# Patient Record
Sex: FEMALE | Race: WHITE | HISPANIC OR LATINO | Employment: FULL TIME | URBAN - METROPOLITAN AREA
[De-identification: names, ages, dates, MRNs, and addresses within clinical notes are randomized per-mention and may not be internally consistent; named-entity substitution may affect disease eponyms.]

---

## 2017-10-10 ENCOUNTER — HOSPITAL ENCOUNTER (OUTPATIENT)
Facility: MEDICAL CENTER | Age: 22
End: 2017-10-10
Payer: COMMERCIAL

## 2017-10-10 LAB
ALBUMIN SERPL BCP-MCNC: 4.2 G/DL (ref 3.2–4.9)
ALBUMIN/GLOB SERPL: 1.1 G/DL
ALP SERPL-CCNC: 96 U/L (ref 30–99)
ALT SERPL-CCNC: 219 U/L (ref 2–50)
ANION GAP SERPL CALC-SCNC: 9 MMOL/L (ref 0–11.9)
AST SERPL-CCNC: 89 U/L (ref 12–45)
BDY FAT % MEASURED: 37.1 %
BILIRUB SERPL-MCNC: 0.5 MG/DL (ref 0.1–1.5)
BP DIAS: 78 MMHG
BP SYS: 124 MMHG
BUN SERPL-MCNC: 11 MG/DL (ref 8–22)
CALCIUM SERPL-MCNC: 9.2 MG/DL (ref 8.5–10.5)
CHLORIDE SERPL-SCNC: 102 MMOL/L (ref 96–112)
CHOLEST SERPL-MCNC: 202 MG/DL (ref 100–199)
CO2 SERPL-SCNC: 24 MMOL/L (ref 20–33)
CREAT SERPL-MCNC: 0.58 MG/DL (ref 0.5–1.4)
DIABETES HTDIA: NO
EVENT NAME HTEVT: NORMAL
GFR SERPL CREATININE-BSD FRML MDRD: >60 ML/MIN/1.73 M 2
GLOBULIN SER CALC-MCNC: 3.8 G/DL (ref 1.9–3.5)
GLUCOSE SERPL-MCNC: 145 MG/DL (ref 65–99)
HDLC SERPL-MCNC: 42 MG/DL
HYPERTENSION HTHYP: NO
LDLC SERPL CALC-MCNC: 133 MG/DL
POTASSIUM SERPL-SCNC: 3.7 MMOL/L (ref 3.6–5.5)
PROT SERPL-MCNC: 8 G/DL (ref 6–8.2)
SCREENING LOC CITY HTCIT: NORMAL
SCREENING LOC STATE HTSTA: NORMAL
SCREENING LOCATION HTLOC: NORMAL
SODIUM SERPL-SCNC: 135 MMOL/L (ref 135–145)
SUBSCRIBER ID HTSID: NORMAL
TRIGL SERPL-MCNC: 136 MG/DL (ref 0–149)

## 2017-10-20 ENCOUNTER — OFFICE VISIT (OUTPATIENT)
Dept: MEDICAL GROUP | Facility: MEDICAL CENTER | Age: 22
End: 2017-10-20
Payer: COMMERCIAL

## 2017-10-20 VITALS
RESPIRATION RATE: 61 BRPM | HEIGHT: 63 IN | SYSTOLIC BLOOD PRESSURE: 122 MMHG | OXYGEN SATURATION: 100 % | BODY MASS INDEX: 32.78 KG/M2 | HEART RATE: 56 BPM | TEMPERATURE: 98.7 F | DIASTOLIC BLOOD PRESSURE: 80 MMHG | WEIGHT: 185 LBS

## 2017-10-20 DIAGNOSIS — Z23 NEED FOR VACCINATION: ICD-10-CM

## 2017-10-20 DIAGNOSIS — E78.00 ELEVATED LDL CHOLESTEROL LEVEL: ICD-10-CM

## 2017-10-20 DIAGNOSIS — R74.01 TRANSAMINITIS: ICD-10-CM

## 2017-10-20 DIAGNOSIS — Z00.00 ANNUAL PHYSICAL EXAM: ICD-10-CM

## 2017-10-20 DIAGNOSIS — R73.09 ELEVATED GLUCOSE LEVEL: ICD-10-CM

## 2017-10-20 PROCEDURE — 90471 IMMUNIZATION ADMIN: CPT | Performed by: NURSE PRACTITIONER

## 2017-10-20 PROCEDURE — 90715 TDAP VACCINE 7 YRS/> IM: CPT | Performed by: NURSE PRACTITIONER

## 2017-10-20 PROCEDURE — 99204 OFFICE O/P NEW MOD 45 MIN: CPT | Mod: 25 | Performed by: NURSE PRACTITIONER

## 2017-10-20 ASSESSMENT — PATIENT HEALTH QUESTIONNAIRE - PHQ9: CLINICAL INTERPRETATION OF PHQ2 SCORE: 0

## 2017-10-20 NOTE — ASSESSMENT & PLAN NOTE
Patient got lab work done for work physical. AST 89 and . Alk phos normal. Her lipid panel and glucose levels were elevated as well however she was not fasting at this time. She reports her diet has been generally unhealthy. She reports eating daily fast food and soda as well as not drinking any water. She also reports she has not been exercising. Since she got her lab results back she has changed her diet to include no more fast food or soda and has drastically increased her water intake. She is also started exercising.

## 2017-10-21 NOTE — PROGRESS NOTES
"Jadyn Morgan is a 22 y.o. female here to discuss abnormal labs.     HPI:    Transaminitis  Patient got lab work done for work physical. AST 89 and . Alk phos normal. Her lipid panel and glucose levels were elevated as well however she was not fasting at this time. She reports her diet has been generally unhealthy. She reports eating daily fast food and soda as well as not drinking any water. She also reports she has not been exercising. Since she got her lab results back she has changed her diet to include no more fast food or soda and has drastically increased her water intake. She is also started exercising.    Current medicines (including changes today)  No current outpatient prescriptions on file.     No current facility-administered medications for this visit.      She  has no past medical history on file.  She  has no past surgical history on file.  Social History   Substance Use Topics   • Smoking status: Never Smoker   • Smokeless tobacco: Never Used   • Alcohol use Yes     Social History     Social History Narrative   • No narrative on file     No family history on file.  No family status information on file.         ROS  No chest pain, no abdominal pain, no rash.  All other systems reviewed and are negative      Objective:     Blood pressure 122/80, pulse (!) 56, temperature 37.1 °C (98.7 °F), resp. rate (!) 61, height 1.6 m (5' 2.99\"), weight 83.9 kg (185 lb), SpO2 100 %. Body mass index is 32.78 kg/m².     Physical Exam:    Constitutional: Alert, no distress.  Skin: Warm, dry, good turgor, no rashes in visible areas.  Eye: Equal, round, conjunctiva clear, lids normal.  ENMT: Lips without lesions, good dentition.  Neck: Trachea midline  Respiratory: Unlabored respiratory effort  Cardiovascular: Normal S1, S2, no murmur, no edema.  Psych: Alert and oriented x3, normal affect and mood.        Assessment and Plan:   The following treatment plan was discussed    1. Annual physical exam  Check labs. " Follow-up for annual and Pap in 1 month.  - TSH WITH REFLEX TO FT4; Future  - VITAMIN D,25 HYDROXY; Future    2. Transaminitis  Unstable.  Patient denies any recent illness, abdominal pain, heavy alcohol use, acetaminophen use.  Differential diagnoses include :  Idiopathic/viral transaminitis vs fatty liver transaminitis  Recheck CMP in a few weeks.  If still elevated will consider abdominal ultrasound.  - COMP METABOLIC PANEL; Future    3. Elevated LDL cholesterol level  Not fasting at time of draw.  Recheck labs.  Advised significant dietary changes.  - LIPID PROFILE; Future    4. Elevated glucose level  Nonfasting at time of draw.  Recheck fasting glucose and A1c.  - HEMOGLOBIN A1C; Future    5. Need for vaccination  Immunized in office.  - Tdap =>8yo IM      Records requested.  Followup: Return in about 4 weeks (around 11/17/2017) for annual, Pap, follow-up on labs.    I have placed the below orders and discussed them with an approved delegating provider. The MA is performing the below orders under the direction of Dr. Harman

## 2017-11-22 ENCOUNTER — HOSPITAL ENCOUNTER (OUTPATIENT)
Dept: LAB | Facility: MEDICAL CENTER | Age: 22
End: 2017-11-22
Attending: NURSE PRACTITIONER
Payer: COMMERCIAL

## 2017-11-22 DIAGNOSIS — R73.09 ELEVATED GLUCOSE LEVEL: ICD-10-CM

## 2017-11-22 DIAGNOSIS — Z00.00 ANNUAL PHYSICAL EXAM: ICD-10-CM

## 2017-11-22 DIAGNOSIS — E78.00 ELEVATED LDL CHOLESTEROL LEVEL: ICD-10-CM

## 2017-11-22 DIAGNOSIS — R74.01 TRANSAMINITIS: ICD-10-CM

## 2017-11-22 LAB
25(OH)D3 SERPL-MCNC: 8 NG/ML (ref 30–100)
ALBUMIN SERPL BCP-MCNC: 4.1 G/DL (ref 3.2–4.9)
ALBUMIN/GLOB SERPL: 1 G/DL
ALP SERPL-CCNC: 94 U/L (ref 30–99)
ALT SERPL-CCNC: 244 U/L (ref 2–50)
ANION GAP SERPL CALC-SCNC: 12 MMOL/L (ref 0–11.9)
AST SERPL-CCNC: 100 U/L (ref 12–45)
BILIRUB SERPL-MCNC: 0.6 MG/DL (ref 0.1–1.5)
BUN SERPL-MCNC: 11 MG/DL (ref 8–22)
CALCIUM SERPL-MCNC: 9.6 MG/DL (ref 8.5–10.5)
CHLORIDE SERPL-SCNC: 104 MMOL/L (ref 96–112)
CHOLEST SERPL-MCNC: 198 MG/DL (ref 100–199)
CO2 SERPL-SCNC: 23 MMOL/L (ref 20–33)
CREAT SERPL-MCNC: 0.56 MG/DL (ref 0.5–1.4)
EST. AVERAGE GLUCOSE BLD GHB EST-MCNC: 120 MG/DL
GFR SERPL CREATININE-BSD FRML MDRD: >60 ML/MIN/1.73 M 2
GLOBULIN SER CALC-MCNC: 4.1 G/DL (ref 1.9–3.5)
GLUCOSE SERPL-MCNC: 83 MG/DL (ref 65–99)
HBA1C MFR BLD: 5.8 % (ref 0–5.6)
HDLC SERPL-MCNC: 38 MG/DL
LDLC SERPL CALC-MCNC: 142 MG/DL
POTASSIUM SERPL-SCNC: 3.8 MMOL/L (ref 3.6–5.5)
PROT SERPL-MCNC: 8.2 G/DL (ref 6–8.2)
SODIUM SERPL-SCNC: 139 MMOL/L (ref 135–145)
TRIGL SERPL-MCNC: 90 MG/DL (ref 0–149)
TSH SERPL DL<=0.005 MIU/L-ACNC: 1.48 UIU/ML (ref 0.3–3.7)

## 2017-11-22 PROCEDURE — 82306 VITAMIN D 25 HYDROXY: CPT

## 2017-11-22 PROCEDURE — 80061 LIPID PANEL: CPT

## 2017-11-22 PROCEDURE — 80053 COMPREHEN METABOLIC PANEL: CPT

## 2017-11-22 PROCEDURE — 36415 COLL VENOUS BLD VENIPUNCTURE: CPT

## 2017-11-22 PROCEDURE — 84443 ASSAY THYROID STIM HORMONE: CPT

## 2017-11-22 PROCEDURE — 83036 HEMOGLOBIN GLYCOSYLATED A1C: CPT

## 2017-11-27 ENCOUNTER — OFFICE VISIT (OUTPATIENT)
Dept: MEDICAL GROUP | Facility: MEDICAL CENTER | Age: 22
End: 2017-11-27
Payer: COMMERCIAL

## 2017-11-27 ENCOUNTER — HOSPITAL ENCOUNTER (OUTPATIENT)
Facility: MEDICAL CENTER | Age: 22
End: 2017-11-27
Attending: NURSE PRACTITIONER
Payer: COMMERCIAL

## 2017-11-27 VITALS
HEIGHT: 63 IN | SYSTOLIC BLOOD PRESSURE: 128 MMHG | TEMPERATURE: 97.4 F | HEART RATE: 124 BPM | BODY MASS INDEX: 33.49 KG/M2 | OXYGEN SATURATION: 97 % | RESPIRATION RATE: 16 BRPM | DIASTOLIC BLOOD PRESSURE: 80 MMHG | WEIGHT: 189 LBS

## 2017-11-27 DIAGNOSIS — R74.01 TRANSAMINITIS: ICD-10-CM

## 2017-11-27 DIAGNOSIS — Z12.4 PAP SMEAR FOR CERVICAL CANCER SCREENING: ICD-10-CM

## 2017-11-27 DIAGNOSIS — E78.00 PURE HYPERCHOLESTEROLEMIA: ICD-10-CM

## 2017-11-27 DIAGNOSIS — R73.09 ELEVATED HEMOGLOBIN A1C: ICD-10-CM

## 2017-11-27 DIAGNOSIS — E55.9 VITAMIN D DEFICIENCY: ICD-10-CM

## 2017-11-27 PROCEDURE — 87510 GARDNER VAG DNA DIR PROBE: CPT

## 2017-11-27 PROCEDURE — 88175 CYTOPATH C/V AUTO FLUID REDO: CPT

## 2017-11-27 PROCEDURE — 87491 CHLMYD TRACH DNA AMP PROBE: CPT

## 2017-11-27 PROCEDURE — 87660 TRICHOMONAS VAGIN DIR PROBE: CPT

## 2017-11-27 PROCEDURE — 99395 PREV VISIT EST AGE 18-39: CPT | Performed by: NURSE PRACTITIONER

## 2017-11-27 PROCEDURE — 87591 N.GONORRHOEAE DNA AMP PROB: CPT

## 2017-11-27 PROCEDURE — 87480 CANDIDA DNA DIR PROBE: CPT

## 2017-11-27 RX ORDER — ERGOCALCIFEROL 1.25 MG/1
50000 CAPSULE ORAL
Qty: 8 CAP | Refills: 0 | Status: SHIPPED | OUTPATIENT
Start: 2017-11-27 | End: 2019-04-01

## 2017-11-27 NOTE — PROGRESS NOTES
SUBJECTIVE: 22 y.o. female for annual routine gynecologic exam  Chief Complaint   Patient presents with   • Annual Exam   • Gynecologic Exam       Obstetric History     No data available      Last Pap: Never  History   Sexual Activity   • Sexual activity: Not on file     Sexual history: currently sexually active, single partner, bisexual, initial gynecologic visit today   H/O Abnormal Pap no  She  reports that she has never smoked. She has never used smokeless tobacco.        Allergies: Patient has no known allergies.     ROS:    Reports no menopause symptoms of hot flashes, night sweats, sleep disruption, mood changes.Denies vaginal dryness.   Menses every month with 5 days moderate bleeding   Cramping is mild.   She does not take OTC analgesics for cramping  No significant bloating/fluid retention, pelvic pain, or dyspareunia. No vaginal discharge   No breast tenderness, dimpling, mass, nipple discharge, skin changes, changes in size or contour, or abnormal cyclic discomfort.  No urinary tract symptoms, no incontinence, no polydipsia, polyuria,  No abdominal pain, change in bowel habits, black or bloody stools.    No unusual headaches, no visual changes, menstrual migraines   No prolonged cough. No dyspnea or chest pain on exertion.  No depression, labile mood, anxiety, libido changes, insomnia.  No temperature intolerance.  No new/concerning skin lesions, concerns. No    Exercise: no regular exercise program  Preventive Care:Established    Current medicines (including changes today)  Current Outpatient Prescriptions   Medication Sig Dispense Refill   • ergocalciferol (DRISDOL) 28069 UNIT capsule Take 1 Cap by mouth every 7 days. 8 Cap 0   • Cholecalciferol (VITAMIND D3) 1000 UNIT Tab Take 1 Tab by mouth every day. 30 Tab 11     No current facility-administered medications for this visit.      She  has no past medical history on file.  She  has no past surgical history on file.     Family History: No family  "history on file.    OBJECTIVE:   /80   Pulse (!) 124   Temp 36.3 °C (97.4 °F)   Resp 16   Ht 1.6 m (5' 2.99\")   Wt 85.7 kg (189 lb)   SpO2 97%   BMI 33.49 kg/m²   Body mass index is 33.49 kg/m².    General/Constitutional: Vitals as above, Well nourished, well developed female in no acute distress  HEAD AND NECK:  Ears normal.  Throat, oral cavity and tongue normal.  Neck supple. No adenopathy or masses in the neck or supraclavicular regions.  No carotid bruits. No thyromegaly.   PSYCH: Judgment grossly appropriate, no apparent depression/anxiety  NEURO: Cranial nerves are normal. DTR's normal and symmetric.    CHEST:  Clear, good air entry, no wheezes or rales.   HEART:  Regular rate and rhythm.  S1 and S2 normal.  No edema or JVD. ABDOMEN:  Soft without tenderness, guarding, mass or organomegaly.  No CVA tenderness or inguinal adenopathy.   Genitourinary: Grossly normal external female genitalia with skin within normal limits,   EXTREMITIES:  Extremities, reflexes and peripheral pulses are normal.   SKIN: color normal, vascularity normal, no edema, temperature normal   No rashes or suspicious skin lesions noted.     Breast Exam: Symmetrical, normal consistency without masses., No dimpling or skin changes, Normal nipples without discharge, no axillary lymphadenopathy  Pelvic Exam -  Normal external genitalia with no lesions. Vaginal Mucosa:  normal vaginal mucosa, normal discharge, no bleeding . no adnexal masses or tenderness, no cervical motion tenderness, no uterine tenderness, Small speculum used with warm water as lubrication, Cervix well visualized with no discharge/friability/bleeding, cervical broom used to obtain cervical specimen, post-specimen collection demonstrated mild bleeding, Cervix with some visible tissue changes at the os. Thin Prep Pap is obtained, vaginal swab is obtained and specimen(s) sent to lab  Rectal: deferred    <ASSESSMENT and PLAN>  1. Pap smear for cervical cancer " screening  Patient's 1st Pap smear completed. Call with results.  THINPREP RFLX HPV ASCUS W/CTNG    VAGINAL PATHOGENS DNA PANEL   2. Transaminitis  Unstable.  Slightly worsened.  Recheck CMP in 3 months.  Check liver and biliary tree ultrasound  COMP METABOLIC PANEL    US-LIVER AND BILIARY TREE   3. Vitamin D deficiency  Unstable.  Begin taking ergocalciferol once weekly for 8 weeks, then begin cholecalciferol once daily thereafter.  Recheck vitamin D in 3 months.  ergocalciferol (DRISDOL) 70998 UNIT capsule    Cholecalciferol (VITAMIND D3) 1000 UNIT Tab    VITAMIN D,25 HYDROXY   4. Elevated hemoglobin A1c  Unstable.  Prediabetic range.  Patient is making multiple lifestyle changes in order to improve her overall health. She is going to start a nutrition program through her work and begin exercising regularly.  Diet and lifestyle modifications discussed in detail.  HEMOGLOBIN A1C   5. Pure hypercholesterolemia  Unstable.  Patient making lifestyle changes and dietary changes, as above.  LIPID PROFILE       Discussed  breast self exam, STD prevention, feminine hygiene, adequate intake of calcium and vitamin D, diet and exercise   Follow-up in 1 years for next Gyn exam and 3 years for next Pap depending on results.   Next office visit for recheck of chronic medical conditions is due in 3 months

## 2017-11-28 LAB
CANDIDA DNA VAG QL PROBE+SIG AMP: NEGATIVE
G VAGINALIS DNA VAG QL PROBE+SIG AMP: POSITIVE
T VAGINALIS DNA VAG QL PROBE+SIG AMP: NEGATIVE

## 2017-11-29 LAB
C TRACH DNA GENITAL QL NAA+PROBE: NEGATIVE
CYTOLOGY REG CYTOL: NORMAL
N GONORRHOEA DNA GENITAL QL NAA+PROBE: NEGATIVE
SPECIMEN SOURCE: NORMAL

## 2017-11-30 ENCOUNTER — TELEPHONE (OUTPATIENT)
Dept: MEDICAL GROUP | Facility: MEDICAL CENTER | Age: 22
End: 2017-11-30

## 2017-11-30 ENCOUNTER — PATIENT MESSAGE (OUTPATIENT)
Dept: MEDICAL GROUP | Facility: MEDICAL CENTER | Age: 22
End: 2017-11-30

## 2017-11-30 DIAGNOSIS — B96.89 BACTERIAL VAGINOSIS: ICD-10-CM

## 2017-11-30 DIAGNOSIS — N76.0 BACTERIAL VAGINOSIS: ICD-10-CM

## 2017-11-30 RX ORDER — METRONIDAZOLE 500 MG/1
500 TABLET ORAL 2 TIMES DAILY
Qty: 14 TAB | Refills: 0 | Status: SHIPPED | OUTPATIENT
Start: 2017-11-30 | End: 2017-12-07

## 2017-11-30 NOTE — TELEPHONE ENCOUNTER
From: Jadyn Mcgrath  To: MALIK Jimenez  Sent: 11/30/2017 9:08 AM PST  Subject: Test Result Question    Good morning,  I've seen that i came out positive in one of my results with my pap. Do i need any medication for that ? is it something i should be concerned about ?    Thank you,  Jadyn Morgan

## 2018-01-08 ENCOUNTER — OFFICE VISIT (OUTPATIENT)
Dept: URGENT CARE | Facility: CLINIC | Age: 23
End: 2018-01-08
Payer: COMMERCIAL

## 2018-01-08 VITALS
WEIGHT: 180.4 LBS | HEART RATE: 122 BPM | HEIGHT: 63 IN | OXYGEN SATURATION: 98 % | DIASTOLIC BLOOD PRESSURE: 78 MMHG | RESPIRATION RATE: 18 BRPM | TEMPERATURE: 99.3 F | SYSTOLIC BLOOD PRESSURE: 110 MMHG | BODY MASS INDEX: 31.96 KG/M2

## 2018-01-08 DIAGNOSIS — R68.89 FLU-LIKE SYMPTOMS: ICD-10-CM

## 2018-01-08 DIAGNOSIS — J40 BRONCHITIS: ICD-10-CM

## 2018-01-08 DIAGNOSIS — J10.1 INFLUENZA A: ICD-10-CM

## 2018-01-08 DIAGNOSIS — R11.2 NON-INTRACTABLE VOMITING WITH NAUSEA, UNSPECIFIED VOMITING TYPE: ICD-10-CM

## 2018-01-08 DIAGNOSIS — R50.81 FEVER IN OTHER DISEASES: ICD-10-CM

## 2018-01-08 DIAGNOSIS — R05.9 COUGH: ICD-10-CM

## 2018-01-08 LAB
FLUAV+FLUBV AG SPEC QL IA: NORMAL
INT CON NEG: NORMAL
INT CON POS: NORMAL

## 2018-01-08 PROCEDURE — 87804 INFLUENZA ASSAY W/OPTIC: CPT | Performed by: NURSE PRACTITIONER

## 2018-01-08 PROCEDURE — 99214 OFFICE O/P EST MOD 30 MIN: CPT | Performed by: NURSE PRACTITIONER

## 2018-01-08 RX ORDER — ONDANSETRON 4 MG/1
4 TABLET, ORALLY DISINTEGRATING ORAL EVERY 8 HOURS PRN
Qty: 10 TAB | Refills: 0 | Status: SHIPPED | OUTPATIENT
Start: 2018-01-08 | End: 2018-10-17

## 2018-01-08 RX ORDER — ALBUTEROL SULFATE 90 UG/1
2 AEROSOL, METERED RESPIRATORY (INHALATION) EVERY 6 HOURS PRN
Qty: 8.5 G | Refills: 0 | Status: SHIPPED | OUTPATIENT
Start: 2018-01-08 | End: 2019-04-14

## 2018-01-08 RX ORDER — AZITHROMYCIN 250 MG/1
TABLET, FILM COATED ORAL
Qty: 6 TAB | Refills: 0 | Status: SHIPPED | OUTPATIENT
Start: 2018-01-08 | End: 2018-10-17

## 2018-01-08 RX ORDER — OSELTAMIVIR PHOSPHATE 75 MG/1
75 CAPSULE ORAL 2 TIMES DAILY
Qty: 10 CAP | Refills: 0 | Status: SHIPPED | OUTPATIENT
Start: 2018-01-08 | End: 2018-01-13

## 2018-01-08 RX ORDER — IBUPROFEN 200 MG
600 TABLET ORAL ONCE
Status: COMPLETED | OUTPATIENT
Start: 2018-01-08 | End: 2018-01-08

## 2018-01-08 RX ORDER — CODEINE PHOSPHATE AND GUAIFENESIN 10; 100 MG/5ML; MG/5ML
5 SOLUTION ORAL EVERY 6 HOURS PRN
Qty: 120 ML | Refills: 0 | Status: SHIPPED | OUTPATIENT
Start: 2018-01-08 | End: 2018-01-15

## 2018-01-08 RX ADMIN — Medication 600 MG: at 20:19

## 2018-01-08 ASSESSMENT — ENCOUNTER SYMPTOMS
MYALGIAS: 1
SINUS PAIN: 1
COUGH: 1
NAUSEA: 1
FEVER: 1
CHILLS: 1
SORE THROAT: 1

## 2018-01-08 NOTE — LETTER
January 8, 2018         Patient: Jadyn Mcgrath   YOB: 1995   Date of Visit: 1/8/2018           To Whom it May Concern:    Jadyn Mcgrath was seen in my clinic on 1/8/2018. She may return to work on 1/15/18    If you have any questions or concerns, please don't hesitate to call.        Sincerely,           Cathey J Hamman, A.P.N.  Electronically Signed

## 2018-01-08 NOTE — PROGRESS NOTES
Subjective:      Jadyn Mcgrath is a 22 y.o. female who presents with Influenza (x 3 days) and Nausea (since this morning)    Past Medical History:   Diagnosis Date   • Elevated hemoglobin A1c    • Hyperlipidemia    • Transaminitis    • Vitamin D deficiency      Social History     Social History   • Marital status: Single     Spouse name: N/A   • Number of children: N/A   • Years of education: N/A     Occupational History   • Not on file.     Social History Main Topics   • Smoking status: Never Smoker   • Smokeless tobacco: Never Used   • Alcohol use Yes      Comment: 5 drinks/month   • Drug use: No   • Sexual activity: Yes     Partners: Female     Birth control/ protection: Other-See Comments      Comment: female partner     Other Topics Concern   • Not on file     Social History Narrative   • No narrative on file     Family History   Problem Relation Age of Onset   • Diabetes Father        Allergies: Patient has no known allergies.    Patient is a 22-year-old female who presents with complaint of fever, aches, and chills with sore throat, cough, and burning in her chest. Symptoms started 3 days ago. She is currently taking amoxicillin that her mother brought her from [place]. She states this has not helped.            Influenza   This is a new problem. The current episode started in the past 7 days. The problem occurs constantly. The problem has been unchanged. Associated symptoms include chest pain, chills, congestion, coughing, a fever, myalgias, nausea and a sore throat. Nothing aggravates the symptoms. She has tried nothing for the symptoms. The treatment provided no relief.   Nausea   Associated symptoms include chest pain, chills, congestion, coughing, a fever, myalgias, nausea and a sore throat.       Review of Systems   Constitutional: Positive for chills, fever and malaise/fatigue.   HENT: Positive for congestion, sinus pain and sore throat.    Respiratory: Positive for cough.   "  Cardiovascular: Positive for chest pain.   Gastrointestinal: Positive for nausea.   Musculoskeletal: Positive for myalgias.   Skin: Negative.    All other systems reviewed and are negative.         Objective:     /78   Pulse (!) 122   Temp 37.4 °C (99.3 °F)   Resp 18   Ht 1.6 m (5' 3\")   Wt 81.8 kg (180 lb 6.4 oz)   SpO2 98%   BMI 31.96 kg/m²      Physical Exam   Constitutional: She is oriented to person, place, and time. She appears well-developed and well-nourished.   HENT:   Head: Normocephalic.   Right Ear: External ear normal.   Left Ear: External ear normal.   Nose: Nose normal.   Mouth/Throat: Oropharynx is clear and moist. No oropharyngeal exudate.   Eyes: Conjunctivae and EOM are normal. Pupils are equal, round, and reactive to light.   Neck: Normal range of motion. Neck supple.   Cardiovascular: Normal rate and regular rhythm.    Pulmonary/Chest: Effort normal and breath sounds normal.   Musculoskeletal: Normal range of motion.   Neurological: She is alert and oriented to person, place, and time.   Skin: Skin is warm and dry. Capillary refill takes less than 2 seconds.   Psychiatric: She has a normal mood and affect. Her behavior is normal. Judgment and thought content normal.   Vitals reviewed.       poct influenza: positive A    HR slightly elevated, Temp is 99.3.      Assessment/Plan:   Flu like symptoms  Influenza A  Bronchitis     -ibuprofen 600 mg PO now   -tamiflu   -Zithromax   -cheratussin PRN cough; clearly stated no driving or ETOH with this medication. Checked patient's  nad find no evidence of narcotic misuse. ORT may 1.   -albuterol PRN   -ER precautions for respiratory distress    There are no diagnoses linked to this encounter.      "

## 2018-01-09 NOTE — PROGRESS NOTES
Followed up with patient by telephone from her my chart message. Patient states that she accidentally took 2 doses of Tamiflu and about 30 minutes later began to have nausea and vomiting. She states the nausea and vomiting has subsided and she has been able to keep down some fluids at this point. Still with intermittent nausea, patient is requesting something for that. Prescription for Zofran ODT since the patient's pharmacy. She declines to have it sent to the 24-hour [facility]. Strict ER precautions given for intractable vomiting or diarrhea. Patient verbalized understanding and agreement with plan of care.

## 2018-02-05 ENCOUNTER — HOSPITAL ENCOUNTER (OUTPATIENT)
Dept: RADIOLOGY | Facility: MEDICAL CENTER | Age: 23
End: 2018-02-05
Attending: NURSE PRACTITIONER
Payer: COMMERCIAL

## 2018-02-05 DIAGNOSIS — R74.01 TRANSAMINITIS: ICD-10-CM

## 2018-02-05 PROCEDURE — 76705 ECHO EXAM OF ABDOMEN: CPT

## 2018-06-22 DIAGNOSIS — E55.9 VITAMIN D DEFICIENCY: ICD-10-CM

## 2018-06-22 RX ORDER — ERGOCALCIFEROL 1.25 MG/1
50000 CAPSULE ORAL
Qty: 8 CAP | Refills: 0 | OUTPATIENT
Start: 2018-06-22

## 2018-06-22 NOTE — TELEPHONE ENCOUNTER
From: Jadyn Mcgrath  Sent: 6/22/2018 1:36 PM PDT  Subject: Medication Renewal Request    Jadyn Mcgrath would like a refill of the following medications:     ergocalciferol (DRISDOL) 75092 UNIT capsule [Ginger Ramsey, ABDULAZIZ.P.R.N.]    Preferred pharmacy: [id number], [place] - [address] [place] [address]

## 2018-06-22 NOTE — TELEPHONE ENCOUNTER
Please notify patient that this prescription is complete.  She should now begin taking the vitamin D3, cholecalciferol, daily as prescribed.  MARCUS Jimenez.

## 2018-10-09 ENCOUNTER — HOSPITAL ENCOUNTER (OUTPATIENT)
Facility: MEDICAL CENTER | Age: 23
End: 2018-10-09
Payer: COMMERCIAL

## 2018-10-09 LAB
BDY FAT % MEASURED: 37.7 %
BP DIAS: 76 MMHG
BP SYS: 116 MMHG
DIABETES HTDIA: NO
EVENT NAME HTEVT: NORMAL
HYPERTENSION HTHYP: NO
SCREENING LOC CITY HTCIT: NORMAL
SCREENING LOC STATE HTSTA: NORMAL
SCREENING LOCATION HTLOC: NORMAL
SUBSCRIBER ID HTSID: NORMAL

## 2018-10-10 LAB
CHOLEST SERPL-MCNC: 242 MG/DL (ref 100–199)
FASTING STATUS PATIENT QL REPORTED: NORMAL
GLUCOSE SERPL-MCNC: 102 MG/DL (ref 65–99)
HDLC SERPL-MCNC: 43 MG/DL
LDLC SERPL CALC-MCNC: 173 MG/DL
TRIGL SERPL-MCNC: 129 MG/DL (ref 0–149)

## 2018-10-15 ENCOUNTER — TELEPHONE (OUTPATIENT)
Dept: MEDICAL GROUP | Facility: MEDICAL CENTER | Age: 23
End: 2018-10-15

## 2018-10-15 NOTE — TELEPHONE ENCOUNTER
----- Message from MALIK Jimenez sent at 10/12/2018  4:39 PM PDT -----  Please notify patient that her healthy tracks labs show that her LDL, bad cholesterol is very high at 173, this should be less than 100. Her cholesterol levels at her age significantly increase her risk of developing heart disease at a young age. We will need to discuss this further at her follow up appointment. I have sent dietary information to her via [employer] to review and implement.     Her fasting blood sugar levels are also elevated increasing her chance of developing diabetes in the future. I have sent instructions regarding this as well.     MALIK Jimenez

## 2018-10-17 ENCOUNTER — OFFICE VISIT (OUTPATIENT)
Dept: MEDICAL GROUP | Facility: MEDICAL CENTER | Age: 23
End: 2018-10-17
Payer: COMMERCIAL

## 2018-10-17 VITALS
BODY MASS INDEX: 31.89 KG/M2 | HEIGHT: 63 IN | HEART RATE: 78 BPM | OXYGEN SATURATION: 98 % | RESPIRATION RATE: 16 BRPM | SYSTOLIC BLOOD PRESSURE: 122 MMHG | WEIGHT: 180 LBS | DIASTOLIC BLOOD PRESSURE: 78 MMHG | TEMPERATURE: 98.4 F

## 2018-10-17 DIAGNOSIS — R73.03 PREDIABETES: ICD-10-CM

## 2018-10-17 DIAGNOSIS — D22.30 CHANGE IN FACIAL MOLE: ICD-10-CM

## 2018-10-17 DIAGNOSIS — E78.00 PURE HYPERCHOLESTEROLEMIA: ICD-10-CM

## 2018-10-17 PROCEDURE — 99214 OFFICE O/P EST MOD 30 MIN: CPT | Performed by: NURSE PRACTITIONER

## 2018-10-17 NOTE — ASSESSMENT & PLAN NOTE
Patient recently joined the gym and is working on improving her diet. She states that for the past 6 months she has not been eating very well and has been eating a lot of fast food.

## 2018-10-17 NOTE — ASSESSMENT & PLAN NOTE
Started noticing one mole on her chin getting bigger. Has also had issues with it occasionally bleeding and reports that it is painful to the touch. She also has a mole on her left jaw line which has been intermittently bleeding, sometimes a lot requiring her to hold pressure. Denies pain with this mole. She would like to have these further evaluated. Denies family history or personal history of skin cancer.

## 2018-10-17 NOTE — PROGRESS NOTES
"Subjective:   Jadyn Ferrara is a 23 y.o. female here today for lab follow-up and change in mole:    Change in facial mole  Started noticing one mole on her chin getting bigger. Has also had issues with it occasionally bleeding and reports that it is painful to the touch. She also has a mole on her left jaw line which has been intermittently bleeding, sometimes a lot requiring her to hold pressure. Denies pain with this mole. She would like to have these further evaluated. Denies family history or personal history of skin cancer.     Pure hypercholesterolemia  Patient recently joined the gym and is working on improving her diet. She states that for the past 6 months she has not been eating very well and has been eating a lot of fast food.       Current medicines (including changes today)  Current Outpatient Prescriptions   Medication Sig Dispense Refill   • albuterol 108 (90 Base) MCG/ACT Aero Soln inhalation aerosol Inhale 2 Puffs by mouth every 6 hours as needed. 8.5 g 0   • ergocalciferol (DRISDOL) 96094 UNIT capsule Take 1 Cap by mouth every 7 days. 8 Cap 0   • Cholecalciferol (VITAMIND D3) 1000 UNIT Tab Take 1 Tab by mouth every day. 30 Tab 11     No current facility-administered medications for this visit.      She  has a past medical history of Elevated hemoglobin A1c; Hyperlipidemia; Transaminitis; and Vitamin D deficiency.    ROS   No chest pain, no shortness of breath, no abdominal pain  Positive ROS as per HPI.  All other systems reviewed and are negative.     Objective:     Blood pressure 122/78, pulse 78, temperature 36.9 °C (98.4 °F), temperature source Temporal, resp. rate 16, height 1.6 m (5' 2.99\"), weight 81.6 kg (180 lb), SpO2 98 %. Body mass index is 31.89 kg/m².     Physical Exam:  Constitutional: Alert, no distress.  Skin: Warm, dry, good turgor, no rashes in visible areas.  Eye: Equal, round and reactive, conjunctiva clear, lids normal.  ENMT: Lips without lesions, good " dentition, oropharynx clear.  Neck: Trachea midline, no masses, no thyromegaly. No cervical or supraclavicular lymphadenopathy  Respiratory: Unlabored respiratory effort, lungs clear to auscultation, no wheezes, no ronchi.  Cardiovascular: Normal S1, S2, no murmur, no edema.  Abdomen: Soft, non-tender, no masses, no hepatosplenomegaly.  Psych: Alert and oriented x3, normal affect and mood.      Assessment and Plan:   The following treatment plan was discussed    1. Change in facial mole  Unstable.  Concerning due to recent growth and intermittent bleeding and scabbing.  Referred to dermatology, due to long wait times with dermatology advised patient to schedule an appointment with Dr. Fairchild for possible biopsy or removal.  - REFERRAL TO DERMATOLOGY    2. Pure hypercholesterolemia  Unstable.  Dietary and lifestyle modifications discussed in depth.  Recheck lipids in 3 months  - LIPID PROFILE; Future    3. Prediabetes  Unstable.  Dietary and lifestyle modifications discussed in depth.  Recheck A1c in 3 months  - HEMOGLOBIN A1C; Future      Followup: Return in about 3 months (around 1/17/2019) for Review Labs, Cholesterol.    I have placed the below orders and discussed them with an approved delegating provider. The MA is performing the below orders under the direction of Dr. Harman

## 2019-04-01 ENCOUNTER — OFFICE VISIT (OUTPATIENT)
Dept: MEDICAL GROUP | Facility: MEDICAL CENTER | Age: 24
End: 2019-04-01
Payer: COMMERCIAL

## 2019-04-01 VITALS
OXYGEN SATURATION: 99 % | RESPIRATION RATE: 16 BRPM | DIASTOLIC BLOOD PRESSURE: 80 MMHG | WEIGHT: 184 LBS | HEIGHT: 63 IN | HEART RATE: 144 BPM | SYSTOLIC BLOOD PRESSURE: 148 MMHG | TEMPERATURE: 98.7 F | BODY MASS INDEX: 32.6 KG/M2

## 2019-04-01 DIAGNOSIS — Z00.00 ANNUAL PHYSICAL EXAM: ICD-10-CM

## 2019-04-01 DIAGNOSIS — L68.0 HIRSUTISM: ICD-10-CM

## 2019-04-01 DIAGNOSIS — R73.03 PREDIABETES: ICD-10-CM

## 2019-04-01 DIAGNOSIS — F41.9 ANXIETY: ICD-10-CM

## 2019-04-01 DIAGNOSIS — N92.6 IRREGULAR MENSES: ICD-10-CM

## 2019-04-01 PROCEDURE — 99214 OFFICE O/P EST MOD 30 MIN: CPT | Performed by: NURSE PRACTITIONER

## 2019-04-01 RX ORDER — PROPRANOLOL HYDROCHLORIDE 20 MG/1
20-40 TABLET ORAL 2 TIMES DAILY PRN
Qty: 30 TAB | Refills: 3 | Status: SHIPPED | OUTPATIENT
Start: 2019-04-01 | End: 2019-04-14

## 2019-04-01 ASSESSMENT — PATIENT HEALTH QUESTIONNAIRE - PHQ9: CLINICAL INTERPRETATION OF PHQ2 SCORE: 0

## 2019-04-01 NOTE — ASSESSMENT & PLAN NOTE
Having increased depression and anxiety since November after her grandmother .  Symptoms include chest pain, dizziness, shortness of breath, feelings of worry and panic.  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 She has had increased life stress as well with work. Has been increasing exercise which has helped some. Anxiety is getting better with outdoor activities and increased exercise.

## 2019-04-01 NOTE — PROGRESS NOTES
"Subjective:   Jadyn Ferrara is a 23 y.o. female here today for anxiety    Anxiety  Having increased depression and anxiety since November after her grandmother .  Symptoms include chest pain, dizziness, shortness of breath, feelings of worry and panic.  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 She has had increased life stress as well with work. Has been increasing exercise which has helped some. Anxiety is getting better with outdoor activities and increased exercise.        Current medicines (including changes today)  Current Outpatient Prescriptions   Medication Sig Dispense Refill   • propranolol (INDERAL) 20 MG Tab Take 1-2 Tabs by mouth 2 times a day as needed (anxiety). 30 Tab 3   • albuterol 108 (90 Base) MCG/ACT Aero Soln inhalation aerosol Inhale 2 Puffs by mouth every 6 hours as needed. 8.5 g 0   • Cholecalciferol (VITAMIND D3) 1000 UNIT Tab Take 1 Tab by mouth every day. 30 Tab 11     No current facility-administered medications for this visit.      She  has a past medical history of Elevated hemoglobin A1c; Hyperlipidemia; Transaminitis; and Vitamin D deficiency.    ROS   No abdominal pain  Positive ROS as per HPI.  All other systems reviewed and are negative.     Objective:     Blood pressure 148/80, pulse (!) 144, temperature 37.1 °C (98.7 °F), temperature source Temporal, resp. rate 16, height 1.6 m (5' 2.99\"), weight 83.5 kg (184 lb), SpO2 99 %. Body mass index is 32.6 kg/m².     Physical Exam:  Constitutional: Alert, no distress.  Skin: Warm, dry, good turgor, no rashes in visible areas.  Eye: Equal, round and reactive, conjunctiva clear, lids normal.  ENMT: Lips without lesions, good dentition, oropharynx clear.  Neck: Trachea midline, no masses, no thyromegaly. No cervical or supraclavicular lymphadenopathy  Respiratory: Unlabored respiratory effort, lungs clear to auscultation, no wheezes, no ronchi.  Cardiovascular: Normal S1, S2, no " murmur, no edema.  Abdomen: Soft, non-tender, no masses, no hepatosplenomegaly.  Psych: Alert and oriented x3, normal affect and mood.      Assessment and Plan:   The following treatment plan was discussed    1. Anxiety  Unstable  Trial of propranolol due to elevated heart rate in sinus tachycardia in office today.  EKG: Sinus tachycardia, rate 138  Check labs  - propranolol (INDERAL) 20 MG Tab; Take 1-2 Tabs by mouth 2 times a day as needed (anxiety).  Dispense: 30 Tab; Refill: 3  - TSH; Future  - FREE THYROXINE; Future  - VITAMIN D,25 HYDROXY; Future  - VITAMIN B12; Future    2. Annual physical exam  - CBC WITH DIFFERENTIAL; Future  - Comp Metabolic Panel; Future  - Lipid Profile; Future  - TSH; Future  - FREE THYROXINE; Future  - VITAMIN D,25 HYDROXY; Future  - VITAMIN B12; Future    3. Prediabetes  - HEMOGLOBIN A1C; Future  - TESTOSTERONE F&T FEMALES/CHILD; Future    4. Hirsutism  - TESTOSTERONE F&T FEMALES/CHILD; Future    5. Irregular menses  - TESTOSTERONE F&T FEMALES/CHILD; Future      Followup: Return in about 4 weeks (around 4/29/2019) for Depression/Anxiety, Review Labs.    I have placed the below orders and discussed them with an approved delegating provider. The MA is performing the below orders under the direction of Dr. Harman

## 2019-04-14 ENCOUNTER — OFFICE VISIT (OUTPATIENT)
Dept: URGENT CARE | Facility: MEDICAL CENTER | Age: 24
End: 2019-04-14
Payer: COMMERCIAL

## 2019-04-14 VITALS
HEART RATE: 100 BPM | TEMPERATURE: 98.8 F | BODY MASS INDEX: 31.89 KG/M2 | HEIGHT: 63 IN | OXYGEN SATURATION: 99 % | SYSTOLIC BLOOD PRESSURE: 132 MMHG | WEIGHT: 180 LBS | DIASTOLIC BLOOD PRESSURE: 78 MMHG

## 2019-04-14 DIAGNOSIS — R05.9 COUGH: ICD-10-CM

## 2019-04-14 DIAGNOSIS — J06.9 UPPER RESPIRATORY TRACT INFECTION, UNSPECIFIED TYPE: ICD-10-CM

## 2019-04-14 PROCEDURE — 99214 OFFICE O/P EST MOD 30 MIN: CPT | Performed by: NURSE PRACTITIONER

## 2019-04-14 RX ORDER — AMOXICILLIN AND CLAVULANATE POTASSIUM 875; 125 MG/1; MG/1
1 TABLET, FILM COATED ORAL 2 TIMES DAILY
Qty: 14 TAB | Refills: 0 | Status: SHIPPED | OUTPATIENT
Start: 2019-04-14 | End: 2019-04-21

## 2019-04-14 RX ORDER — BENZONATATE 100 MG/1
100 CAPSULE ORAL 3 TIMES DAILY PRN
Qty: 30 CAP | Refills: 0 | Status: SHIPPED | OUTPATIENT
Start: 2019-04-14 | End: 2019-09-01

## 2019-04-14 ASSESSMENT — ENCOUNTER SYMPTOMS
FEVER: 0
PALPITATIONS: 0
SORE THROAT: 1
CONSTIPATION: 0
COUGH: 1
DIZZINESS: 0
STRIDOR: 0
BLURRED VISION: 0
DOUBLE VISION: 0
SHORTNESS OF BREATH: 0
MUSCULOSKELETAL NEGATIVE: 1
SPUTUM PRODUCTION: 0
HOARSE VOICE: 0
TROUBLE SWALLOWING: 1
ABDOMINAL PAIN: 0
DIARRHEA: 0
HEADACHES: 0
SINUS PAIN: 1
VOMITING: 0
NAUSEA: 0
CHILLS: 0
WHEEZING: 0

## 2019-04-14 NOTE — PROGRESS NOTES
Subjective:   Jadyn Ferrara is a 23 y.o. female who presents for Pharyngitis (x10 days )        Pharyngitis    This is a new problem. The current episode started 1 to 4 weeks ago. The problem has been waxing and waning. Neither side of throat is experiencing more pain than the other. Maximum temperature: With fever 2 days ago. The pain is moderate. Associated symptoms include congestion, coughing and trouble swallowing. Pertinent negatives include no abdominal pain, diarrhea, drooling, ear discharge, ear pain, headaches, hoarse voice, shortness of breath, stridor or vomiting. She has tried NSAIDs for the symptoms. The treatment provided mild relief.        Review of Systems   Constitutional: Negative for chills and fever.   HENT: Positive for congestion, sinus pain, sore throat and trouble swallowing. Negative for drooling, ear discharge, ear pain and hoarse voice.    Eyes: Negative for blurred vision and double vision.   Respiratory: Positive for cough. Negative for sputum production, shortness of breath, wheezing and stridor.    Cardiovascular: Negative for chest pain and palpitations.   Gastrointestinal: Negative for abdominal pain, constipation, diarrhea, nausea and vomiting.   Musculoskeletal: Negative.    Skin: Negative.  Negative for itching and rash.   Neurological: Negative for dizziness and headaches.   All other systems reviewed and are negative.    PMH:  has a past medical history of Anxiety; Elevated hemoglobin A1c; Hyperlipidemia; Transaminitis; and Vitamin D deficiency.  MEDS:   Current Outpatient Prescriptions:   •  amoxicillin-clavulanate (AUGMENTIN) 875-125 MG Tab, Take 1 Tab by mouth 2 times a day for 7 days., Disp: 14 Tab, Rfl: 0  •  benzonatate (TESSALON) 100 MG Cap, Take 1 Cap by mouth 3 times a day as needed for Cough., Disp: 30 Cap, Rfl: 0  ALLERGIES: No Known Allergies  SURGHX: History reviewed. No pertinent surgical history.  SOCHX:  reports that she has never smoked. She has  "never used smokeless tobacco. She reports that she drinks alcohol. She reports that she does not use drugs.  FH: Family history was reviewed, no pertinent findings to report     Objective:   /78   Pulse 100   Temp 37.1 °C (98.8 °F) (Temporal)   Ht 1.6 m (5' 3\")   Wt 81.6 kg (180 lb)   SpO2 99%   BMI 31.89 kg/m²   Physical Exam   Constitutional: She is oriented to person, place, and time. She appears well-developed and well-nourished. No distress.   HENT:   Head: Normocephalic.   Right Ear: Hearing and ear canal normal. Tympanic membrane is bulging. Tympanic membrane is not erythematous. A middle ear effusion is present.   Left Ear: Hearing, tympanic membrane and ear canal normal. Tympanic membrane is not erythematous.  No middle ear effusion.   Nose: Mucosal edema present. No rhinorrhea. Right sinus exhibits no maxillary sinus tenderness and no frontal sinus tenderness. Left sinus exhibits no maxillary sinus tenderness and no frontal sinus tenderness.   Mouth/Throat: Mucous membranes are normal. Posterior oropharyngeal erythema present. Tonsils are 3+ on the right. Tonsils are 3+ on the left. No tonsillar exudate.   Eyes: Pupils are equal, round, and reactive to light. Conjunctivae, EOM and lids are normal.   Neck: Normal range of motion. No thyromegaly present.   Cardiovascular: Normal rate, regular rhythm and normal heart sounds.    Pulmonary/Chest: Effort normal and breath sounds normal. No accessory muscle usage. No apnea, no tachypnea and no bradypnea. No respiratory distress. She has no decreased breath sounds. She has no wheezes.   Lymphadenopathy:        Head (right side): No submandibular and no tonsillar adenopathy present.        Head (left side): Tonsillar adenopathy present. No submandibular adenopathy present.   Neurological: She is alert and oriented to person, place, and time.   Skin: Skin is warm and dry. No rash noted. She is not diaphoretic.   Psychiatric: She has a normal mood and " affect. Her speech is normal and behavior is normal. Judgment and thought content normal.   Vitals reviewed.        Assessment/Plan:   Assessment    1. Upper respiratory tract infection, unspecified type  - amoxicillin-clavulanate (AUGMENTIN) 875-125 MG Tab; Take 1 Tab by mouth 2 times a day for 7 days.  Dispense: 14 Tab; Refill: 0    2. Cough  - benzonatate (TESSALON) 100 MG Cap; Take 1 Cap by mouth 3 times a day as needed for Cough.  Dispense: 30 Cap; Refill: 0    Patient encouraged to increase clear liquid intake    Differential diagnosis, natural history, supportive care, and indications for immediate follow-up discussed.

## 2019-05-03 ENCOUNTER — HOSPITAL ENCOUNTER (OUTPATIENT)
Dept: LAB | Facility: MEDICAL CENTER | Age: 24
End: 2019-05-03
Attending: NURSE PRACTITIONER
Payer: COMMERCIAL

## 2019-05-03 DIAGNOSIS — R73.03 PREDIABETES: ICD-10-CM

## 2019-05-03 DIAGNOSIS — L68.0 HIRSUTISM: ICD-10-CM

## 2019-05-03 DIAGNOSIS — Z00.00 ANNUAL PHYSICAL EXAM: ICD-10-CM

## 2019-05-03 DIAGNOSIS — N92.6 IRREGULAR MENSES: ICD-10-CM

## 2019-05-03 DIAGNOSIS — F41.9 ANXIETY: ICD-10-CM

## 2019-05-03 LAB
25(OH)D3 SERPL-MCNC: 13 NG/ML (ref 30–100)
ALBUMIN SERPL BCP-MCNC: 4.2 G/DL (ref 3.2–4.9)
ALBUMIN/GLOB SERPL: 1.2 G/DL
ALP SERPL-CCNC: 84 U/L (ref 30–99)
ALT SERPL-CCNC: 153 U/L (ref 2–50)
ANION GAP SERPL CALC-SCNC: 9 MMOL/L (ref 0–11.9)
AST SERPL-CCNC: 54 U/L (ref 12–45)
BASOPHILS # BLD AUTO: 0.5 % (ref 0–1.8)
BASOPHILS # BLD: 0.03 K/UL (ref 0–0.12)
BILIRUB SERPL-MCNC: 0.7 MG/DL (ref 0.1–1.5)
BUN SERPL-MCNC: 12 MG/DL (ref 8–22)
CALCIUM SERPL-MCNC: 9.2 MG/DL (ref 8.5–10.5)
CHLORIDE SERPL-SCNC: 104 MMOL/L (ref 96–112)
CHOLEST SERPL-MCNC: 221 MG/DL (ref 100–199)
CO2 SERPL-SCNC: 25 MMOL/L (ref 20–33)
CREAT SERPL-MCNC: 0.57 MG/DL (ref 0.5–1.4)
EOSINOPHIL # BLD AUTO: 0.19 K/UL (ref 0–0.51)
EOSINOPHIL NFR BLD: 2.9 % (ref 0–6.9)
ERYTHROCYTE [DISTWIDTH] IN BLOOD BY AUTOMATED COUNT: 41 FL (ref 35.9–50)
EST. AVERAGE GLUCOSE BLD GHB EST-MCNC: 114 MG/DL
FASTING STATUS PATIENT QL REPORTED: NORMAL
GLOBULIN SER CALC-MCNC: 3.5 G/DL (ref 1.9–3.5)
GLUCOSE SERPL-MCNC: 98 MG/DL (ref 65–99)
HBA1C MFR BLD: 5.6 % (ref 0–5.6)
HCT VFR BLD AUTO: 40.5 % (ref 37–47)
HDLC SERPL-MCNC: 41 MG/DL
HGB BLD-MCNC: 13.4 G/DL (ref 12–16)
IMM GRANULOCYTES # BLD AUTO: 0.01 K/UL (ref 0–0.11)
IMM GRANULOCYTES NFR BLD AUTO: 0.2 % (ref 0–0.9)
LDLC SERPL CALC-MCNC: 161 MG/DL
LYMPHOCYTES # BLD AUTO: 1.98 K/UL (ref 1–4.8)
LYMPHOCYTES NFR BLD: 29.8 % (ref 22–41)
MCH RBC QN AUTO: 28.3 PG (ref 27–33)
MCHC RBC AUTO-ENTMCNC: 33.1 G/DL (ref 33.6–35)
MCV RBC AUTO: 85.4 FL (ref 81.4–97.8)
MONOCYTES # BLD AUTO: 0.36 K/UL (ref 0–0.85)
MONOCYTES NFR BLD AUTO: 5.4 % (ref 0–13.4)
NEUTROPHILS # BLD AUTO: 4.07 K/UL (ref 2–7.15)
NEUTROPHILS NFR BLD: 61.2 % (ref 44–72)
NRBC # BLD AUTO: 0 K/UL
NRBC BLD-RTO: 0 /100 WBC
PLATELET # BLD AUTO: 341 K/UL (ref 164–446)
PMV BLD AUTO: 9.2 FL (ref 9–12.9)
POTASSIUM SERPL-SCNC: 3.7 MMOL/L (ref 3.6–5.5)
PROT SERPL-MCNC: 7.7 G/DL (ref 6–8.2)
RBC # BLD AUTO: 4.74 M/UL (ref 4.2–5.4)
SODIUM SERPL-SCNC: 138 MMOL/L (ref 135–145)
T4 FREE SERPL-MCNC: 1.01 NG/DL (ref 0.53–1.43)
TRIGL SERPL-MCNC: 96 MG/DL (ref 0–149)
TSH SERPL DL<=0.005 MIU/L-ACNC: 1.74 UIU/ML (ref 0.38–5.33)
VIT B12 SERPL-MCNC: 530 PG/ML (ref 211–911)
WBC # BLD AUTO: 6.6 K/UL (ref 4.8–10.8)

## 2019-05-03 PROCEDURE — 84270 ASSAY OF SEX HORMONE GLOBUL: CPT

## 2019-05-03 PROCEDURE — 84403 ASSAY OF TOTAL TESTOSTERONE: CPT

## 2019-05-03 PROCEDURE — 80061 LIPID PANEL: CPT

## 2019-05-03 PROCEDURE — 83036 HEMOGLOBIN GLYCOSYLATED A1C: CPT

## 2019-05-03 PROCEDURE — 82607 VITAMIN B-12: CPT

## 2019-05-03 PROCEDURE — 84443 ASSAY THYROID STIM HORMONE: CPT

## 2019-05-03 PROCEDURE — 80053 COMPREHEN METABOLIC PANEL: CPT

## 2019-05-03 PROCEDURE — 84439 ASSAY OF FREE THYROXINE: CPT

## 2019-05-03 PROCEDURE — 82306 VITAMIN D 25 HYDROXY: CPT

## 2019-05-03 PROCEDURE — 85025 COMPLETE CBC W/AUTO DIFF WBC: CPT

## 2019-05-03 PROCEDURE — 36415 COLL VENOUS BLD VENIPUNCTURE: CPT

## 2019-05-06 ENCOUNTER — OFFICE VISIT (OUTPATIENT)
Dept: MEDICAL GROUP | Facility: MEDICAL CENTER | Age: 24
End: 2019-05-06
Payer: COMMERCIAL

## 2019-05-06 VITALS
RESPIRATION RATE: 16 BRPM | WEIGHT: 185 LBS | BODY MASS INDEX: 32.78 KG/M2 | SYSTOLIC BLOOD PRESSURE: 128 MMHG | DIASTOLIC BLOOD PRESSURE: 86 MMHG | TEMPERATURE: 98.4 F | HEIGHT: 63 IN | OXYGEN SATURATION: 98 % | HEART RATE: 98 BPM

## 2019-05-06 DIAGNOSIS — E78.00 PURE HYPERCHOLESTEROLEMIA: ICD-10-CM

## 2019-05-06 DIAGNOSIS — R73.03 PREDIABETES: ICD-10-CM

## 2019-05-06 DIAGNOSIS — E55.9 VITAMIN D DEFICIENCY: ICD-10-CM

## 2019-05-06 DIAGNOSIS — J03.91 RECURRENT TONSILLITIS: ICD-10-CM

## 2019-05-06 DIAGNOSIS — R74.01 TRANSAMINITIS: ICD-10-CM

## 2019-05-06 DIAGNOSIS — F41.9 ANXIETY: ICD-10-CM

## 2019-05-06 DIAGNOSIS — J35.8 TONSILLOLITH: ICD-10-CM

## 2019-05-06 LAB
SHBG SERPL-SCNC: 14 NMOL/L (ref 30–135)
TESTOST FREE SERPL-MCNC: 9.2 PG/ML (ref 0.8–7.4)
TESTOST SERPL-MCNC: 38 NG/DL (ref 9–55)

## 2019-05-06 PROCEDURE — 99214 OFFICE O/P EST MOD 30 MIN: CPT | Performed by: NURSE PRACTITIONER

## 2019-05-06 RX ORDER — ERGOCALCIFEROL 1.25 MG/1
50000 CAPSULE ORAL
Qty: 12 CAP | Refills: 0 | Status: SHIPPED | OUTPATIENT
Start: 2019-05-06 | End: 2019-09-01

## 2019-05-07 NOTE — PROGRESS NOTES
Subjective:   Jadyn Mcgrath is a 23 y.o. female here today for lab follow-up:    Anxiety  Has significantly improved after our initial appointment.  Patient use propranolol for a few days, then she began working out and using relaxation techniques which has worked very well.  She is no longer taking propranolol.    Prediabetes  Resolved with diet and exercise.  Does have a family history of diabetes in her father.  Has significantly reduce carbohydrates and sugar and increased activity.  A1c down to 5.6.    Pure hypercholesterolemia  LDL down from 1  with diet and exercise.  She continues to change her diet and lose weight.  Would like to hold off on medication at this time.  Liver function tests have also improved.    Recurrent tonsillitis  Patient reports having issues with tonsillitis throughout her life.  She has recently had more issues and needed to be on antibiotics several times in the past few years.  Recently she is having tonsil stones.  She was told in the past that she should have her tonsils removed as they are very large and often sore.  She is not sure if she snores.  Requesting referral to ENT.    Transaminitis  Improved on recent labs with diet, exercise, and weight loss.      Current medicines (including changes today)  Current Outpatient Prescriptions   Medication Sig Dispense Refill   • ergocalciferol (DRISDOL) 46937 UNIT capsule Take 1 Cap by mouth every 7 days. 12 Cap 0   • benzonatate (TESSALON) 100 MG Cap Take 1 Cap by mouth 3 times a day as needed for Cough. 30 Cap 0     No current facility-administered medications for this visit.      She  has a past medical history of Anxiety; Elevated hemoglobin A1c; Hyperlipidemia; Transaminitis; and Vitamin D deficiency.    ROS No chest pain, no shortness of breath, no abdominal pain  Positive ROS as per HPI.  All other systems reviewed and are negative.     Objective:     /86 (BP Location: Right arm, Patient Position:  "Sitting, BP Cuff Size: Adult)   Pulse 98   Temp 36.9 °C (98.4 °F) (Temporal)   Resp 16   Ht 1.6 m (5' 2.99\")   Wt 83.9 kg (185 lb)   SpO2 98%  Body mass index is 32.78 kg/m².     Physical Exam:  Constitutional: Alert, no distress.  Skin: Warm, dry, good turgor, no rashes in visible areas.  Eye: Equal, round, conjunctiva clear, lids normal.  ENMT: Lips without lesions, good dentition, grade 3 tonsillar hypertrophy, cryptic tonsils, no current tonsilloliths  Neck: Trachea midline  Respiratory: Unlabored respiratory effort  Cardiovascular: No edema.  Abdomen: Obese  Psych: Alert and oriented x3, normal affect and mood.      Assessment and Plan:   The following treatment plan was discussed    1. Vitamin D deficiency  Unstable  Vitamin D2 weekly for 12 weeks, then vitamin D3 2000 IU daily  - ergocalciferol (DRISDOL) 32058 UNIT capsule; Take 1 Cap by mouth every 7 days.  Dispense: 12 Cap; Refill: 0    2. Recurrent tonsillitis  Unstable  Follow-up with ENT  - REFERRAL TO ENT    3. Tonsillolith  Unstable  Follow-up with ENT  - REFERRAL TO ENT    4. Pure hypercholesterolemia  Unstable  Continue diet, exercise, and weight loss  Recheck in 5 months  - Lipid Profile; Future    5. Transaminitis  Unstable, improving  Continue diet, exercise, and weight loss  Recheck in 5 months  - Comp Metabolic Panel; Future    6. Prediabetes  Stable, resolved  Continue diet and exercise  Recheck in 5 months  - HEMOGLOBIN A1C; Future    7. Anxiety  Stable, resolved  Continue exercise and stress reduction  Propranolol as needed      Followup: Return in about 5 months (around 10/6/2019) for Review Labs, Cholesterol.    I have placed the below orders and discussed them with an approved delegating provider. The MA is performing the below orders under the direction of Dr. Harman           "

## 2019-05-07 NOTE — ASSESSMENT & PLAN NOTE
Resolved with diet and exercise.  Does have a family history of diabetes in her father.  Has significantly reduce carbohydrates and sugar and increased activity.  A1c down to 5.6.

## 2019-05-07 NOTE — ASSESSMENT & PLAN NOTE
Has significantly improved after our initial appointment.  Patient use propranolol for a few days, then she began working out and using relaxation techniques which has worked very well.  She is no longer taking propranolol.

## 2019-05-07 NOTE — ASSESSMENT & PLAN NOTE
Patient reports having issues with tonsillitis throughout her life.  She has recently had more issues and needed to be on antibiotics several times in the past few years.  Recently she is having tonsil stones.  She was told in the past that she should have her tonsils removed as they are very large and often sore.  She is not sure if she snores.  Requesting referral to ENT.

## 2019-05-10 ENCOUNTER — TELEPHONE (OUTPATIENT)
Dept: MEDICAL GROUP | Facility: MEDICAL CENTER | Age: 24
End: 2019-05-10

## 2019-05-10 DIAGNOSIS — R79.89 ELEVATED TESTOSTERONE LEVEL IN FEMALE: ICD-10-CM

## 2019-05-11 NOTE — TELEPHONE ENCOUNTER
----- Message from MALIK Jimenez sent at 5/10/2019  4:45 PM PDT -----  Please notify patient that her testosterone levels are slightly elevated.  This can be a cause of her increased hair growth and irregular periods.  We can discuss this further at her follow-up appointment or she can discuss this with her gynecologist if she has one.    MALIK Jimenez

## 2019-05-13 PROBLEM — R79.89 ELEVATED TESTOSTERONE LEVEL IN FEMALE: Status: ACTIVE | Noted: 2019-05-13

## 2019-09-01 ENCOUNTER — OFFICE VISIT (OUTPATIENT)
Dept: URGENT CARE | Facility: MEDICAL CENTER | Age: 24
End: 2019-09-01
Payer: COMMERCIAL

## 2019-09-01 VITALS
RESPIRATION RATE: 16 BRPM | BODY MASS INDEX: 32.25 KG/M2 | WEIGHT: 182 LBS | SYSTOLIC BLOOD PRESSURE: 130 MMHG | OXYGEN SATURATION: 97 % | TEMPERATURE: 99.2 F | DIASTOLIC BLOOD PRESSURE: 80 MMHG | HEART RATE: 122 BPM

## 2019-09-01 DIAGNOSIS — H61.21 IMPACTED CERUMEN OF RIGHT EAR: ICD-10-CM

## 2019-09-01 PROCEDURE — 99213 OFFICE O/P EST LOW 20 MIN: CPT | Performed by: FAMILY MEDICINE

## 2019-09-01 NOTE — PROGRESS NOTES
Subjective:      Jadyn Mcgrath is a 24 y.o. female who presents with Otalgia (rt ear pain x2 days )            This is a new problem.  24-year-old presenting with right ear fullness.  Denies any recent travel, flying or scuba diving or any URI symptoms.  Denies fever chills.      Review of Systems   All other systems reviewed and are negative.         Objective:     /80 (BP Location: Left arm, Patient Position: Sitting, BP Cuff Size: Adult)   Pulse (!) 122   Temp 37.3 °C (99.2 °F) (Temporal)   Resp 16   Wt 82.6 kg (182 lb)   LMP 03/04/2019 (Approximate)   SpO2 97%   BMI 32.25 kg/m²      Physical Exam   Constitutional: She is oriented to person, place, and time. She appears well-developed and well-nourished.  Non-toxic appearance. No distress.   HENT:   Head: Normocephalic and atraumatic.   Right Ear: External ear normal.   Left Ear: Tympanic membrane, external ear and ear canal normal.   Nose: Nose normal.   Right ear canal is impacted with cerumen.  A trial of ear flush was done by staff but was not successful.  Patient does not have any tenderness on palpation of the pinna and tragus.  No erythema noted in the ear canal.  There is a small greenish hue noted at 10 o'clock which is round on top of the impacted cerumen.  It is unclear as to what that is.   Eyes: Conjunctivae are normal.   Neck: Neck supple.   Cardiovascular: Normal rate.   Pulmonary/Chest: Effort normal. No respiratory distress.   Lymphadenopathy:     She has no cervical adenopathy.   Neurological: She is alert and oriented to person, place, and time.   Skin: Skin is warm. No rash noted. No erythema. No pallor.   Psychiatric: She has a normal mood and affect.               Assessment/Plan:     ASSESSMENT:PLAN:  1. Impacted cerumen of right ear  - REFERRAL TO ENT  - Ear Cerumen Removal    Unsuccessful drainage.  Unusual looking impacted right ear with cerumen +/- other thing  Recommend a referral to ENT ASAP

## 2019-09-03 ENCOUNTER — HOSPITAL ENCOUNTER (EMERGENCY)
Facility: MEDICAL CENTER | Age: 24
End: 2019-09-03
Attending: EMERGENCY MEDICINE
Payer: COMMERCIAL

## 2019-09-03 VITALS
TEMPERATURE: 98.6 F | DIASTOLIC BLOOD PRESSURE: 89 MMHG | BODY MASS INDEX: 32.42 KG/M2 | RESPIRATION RATE: 18 BRPM | HEART RATE: 98 BPM | SYSTOLIC BLOOD PRESSURE: 136 MMHG | OXYGEN SATURATION: 98 % | WEIGHT: 182.98 LBS

## 2019-09-03 DIAGNOSIS — H65.01 NON-RECURRENT ACUTE SEROUS OTITIS MEDIA OF RIGHT EAR: ICD-10-CM

## 2019-09-03 PROCEDURE — 99283 EMERGENCY DEPT VISIT LOW MDM: CPT

## 2019-09-03 RX ORDER — IBUPROFEN 800 MG/1
800 TABLET ORAL EVERY 8 HOURS PRN
Qty: 30 TAB | Refills: 0 | Status: SHIPPED | OUTPATIENT
Start: 2019-09-03 | End: 2021-09-09

## 2019-09-03 RX ORDER — AMOXICILLIN 500 MG/1
500 CAPSULE ORAL 3 TIMES DAILY
Qty: 21 CAP | Refills: 0 | Status: SHIPPED | OUTPATIENT
Start: 2019-09-03 | End: 2019-09-10

## 2019-09-03 NOTE — ED PROVIDER NOTES
ED Provider Note    CHIEF COMPLAINT  No chief complaint on file.      HPI  Jadyn Mcgrath is a 24 y.o. female who presents for evaluation of right ear pain.  The patient denies any recent swimming, airplane flights etc.  She denies any drainage of blood or pus from the right ear no hearing loss.  She reports 5 days of dull aching pain and pressure.  No recent ear piercings no high fevers or chills she denies pregnancy.  No recent URI symptoms.    REVIEW OF SYSTEMS  See HPI for further details.  No high fevers chills night sweats weight loss all other systems are negative.     PAST MEDICAL HISTORY  Past Medical History:   Diagnosis Date   • Anxiety    • Elevated hemoglobin A1c    • Hyperlipidemia    • Transaminitis    • Vitamin D deficiency        FAMILY HISTORY  Noncontributory    SOCIAL HISTORY  Social History     Socioeconomic History   • Marital status: Single     Spouse name: Not on file   • Number of children: Not on file   • Years of education: Not on file   • Highest education level: Not on file   Occupational History   • Not on file   Social Needs   • Financial resource strain: Not on file   • Food insecurity:     Worry: Not on file     Inability: Not on file   • Transportation needs:     Medical: Not on file     Non-medical: Not on file   Tobacco Use   • Smoking status: Never Smoker   • Smokeless tobacco: Never Used   Substance and Sexual Activity   • Alcohol use: Yes     Comment: rare   • Drug use: No   • Sexual activity: Yes     Partners: Female     Birth control/protection: Other-See Comments     Comment: female partner   Lifestyle   • Physical activity:     Days per week: Not on file     Minutes per session: Not on file   • Stress: Not on file   Relationships   • Social connections:     Talks on phone: Not on file     Gets together: Not on file     Attends Taoism service: Not on file     Active member of club or organization: Not on file     Attends meetings of clubs or organizations:  Not on file     Relationship status: Not on file   • Intimate partner violence:     Fear of current or ex partner: Not on file     Emotionally abused: Not on file     Physically abused: Not on file     Forced sexual activity: Not on file   Other Topics Concern   • Not on file   Social History Narrative   • Not on file   IV drugs    SURGICAL HISTORY  No past surgical history on file.  No major surgery   CURRENT MEDICATIONS  Home Medications    **Home medications have not yet been reviewed for this encounter**         ALLERGIES  No Known Allergies    PHYSICAL EXAM  VITAL SIGNS: /95   Pulse (!) 110   Temp 37 °C (98.6 °F)   Resp 18   Wt 83 kg (182 lb 15.7 oz)   BMI 32.42 kg/m²  Room air O2: 96    Constitutional: Well developed, Well nourished, No acute distress, Non-toxic appearance.   HENT: Normocephalic, Atraumatic, Bilateral external ears normal, Oropharynx moist, No oral exudates, Nose normal.  There is no pain with manipulation of the external ears bilaterally.  Right tympanic membrane is pink with loss of light reflex no suggestion of perforation  Eyes: PERRLA, EOMI, Conjunctiva normal, No discharge.   Neck: Normal range of motion, No tenderness, Supple, No stridor.   Lymphatic: No lymphadenopathy noted.   Cardiovascular: Normal heart rate, Normal rhythm, No murmurs, No rubs, No gallops.   Thorax & Lungs: Normal breath sounds, No respiratory distress, No wheezing, No chest tenderness.   Abdomen: Bowel sounds normal, Soft, No tenderness, No masses, No pulsatile masses.   Skin: Warm, Dry, No erythema, No rash.   Extremities: Intact distal pulses, No edema, No tenderness, No cyanosis, No clubbing.   Neurologic: Alert & oriented x 3, Normal motor function, Normal sensory function, No focal deficits noted.   Psychiatric: Anxious    COURSE & MEDICAL DECISION MAKING  Pertinent Labs & Imaging studies reviewed. (See chart for details)  Patient here is suggestion of likely early otitis media.  There is no  suggestion of perforation, mastoiditis or otitis externa.  I will start her on amoxicillin high-dose NSAIDs.  I recommended that she return as needed for new or worsening symptoms    FINAL IMPRESSION  1.  Right-sided otitis media         Electronically signed by: Alfonso Keys, 9/3/2019 9:15 AM

## 2019-09-03 NOTE — ED NOTES
DC instructions reviewed. Verbalize understanding. NAD. Denies further need. Ambulated to exit with a steady gait.

## 2019-09-03 NOTE — ED NOTES
Pt to ED today for evaluation of Right ear pain for 4 days. Pt anxious upon entry to room. . Pt states her HR goes up when she is anxious. Right ear has small collection of cerumen at TM. Canal is clear.

## 2020-02-05 ENCOUNTER — APPOINTMENT (OUTPATIENT)
Dept: MEDICAL GROUP | Facility: MEDICAL CENTER | Age: 25
End: 2020-02-05
Payer: COMMERCIAL

## 2020-02-12 ENCOUNTER — OFFICE VISIT (OUTPATIENT)
Dept: MEDICAL GROUP | Facility: MEDICAL CENTER | Age: 25
End: 2020-02-12
Payer: COMMERCIAL

## 2020-02-12 ENCOUNTER — HOSPITAL ENCOUNTER (OUTPATIENT)
Facility: MEDICAL CENTER | Age: 25
End: 2020-02-12
Attending: NURSE PRACTITIONER
Payer: COMMERCIAL

## 2020-02-12 VITALS
TEMPERATURE: 99.6 F | HEIGHT: 63 IN | SYSTOLIC BLOOD PRESSURE: 148 MMHG | OXYGEN SATURATION: 97 % | BODY MASS INDEX: 32.96 KG/M2 | WEIGHT: 186 LBS | RESPIRATION RATE: 16 BRPM | HEART RATE: 92 BPM | DIASTOLIC BLOOD PRESSURE: 100 MMHG

## 2020-02-12 DIAGNOSIS — R03.0 ELEVATED BLOOD PRESSURE READING: ICD-10-CM

## 2020-02-12 DIAGNOSIS — F41.9 ANXIETY: ICD-10-CM

## 2020-02-12 LAB
CREAT UR-MCNC: 69.2 MG/DL
MICROALBUMIN UR-MCNC: <0.7 MG/DL
MICROALBUMIN/CREAT UR: NORMAL MG/G (ref 0–30)

## 2020-02-12 PROCEDURE — 82570 ASSAY OF URINE CREATININE: CPT

## 2020-02-12 PROCEDURE — 99214 OFFICE O/P EST MOD 30 MIN: CPT | Performed by: NURSE PRACTITIONER

## 2020-02-12 PROCEDURE — 82043 UR ALBUMIN QUANTITATIVE: CPT

## 2020-02-12 RX ORDER — PROPRANOLOL HYDROCHLORIDE 40 MG/1
40 TABLET ORAL 2 TIMES DAILY
Qty: 60 TAB | Refills: 6 | Status: SHIPPED | OUTPATIENT
Start: 2020-02-12 | End: 2021-09-09

## 2020-02-12 ASSESSMENT — PATIENT HEALTH QUESTIONNAIRE - PHQ9: CLINICAL INTERPRETATION OF PHQ2 SCORE: 0

## 2020-02-13 NOTE — ASSESSMENT & PLAN NOTE
Patient reports having elevated blood pressure readings at her doctors visits, dentist appointments, and generally when she checks her blood pressure. She states that all of these activities significantly worsen her anxiety, increase her heart rate and blood pressure. They are improved with her as needed propranolol but she has not been taking this every day, only at night before bed as needed as it helps her sleep or about 30 minutes before an appointment.     She has had headaches and intermittent chest pain during episodes of high blood pressure and elevated heart rate.     She does not snore when she sleeps. She has gained weight and is not exercising like she was previously. Her father has a history of HTN.

## 2020-02-13 NOTE — ASSESSMENT & PLAN NOTE
Patient has been taking propranolol 20 mg 1-2 times daily as needed for situational anxiety. She feels that her anxiety has worsened and she is having more episodes. Usually occur when she is at the doctors office, dentist, when checking her blood pressure, and other socially stressful situations.     She has started taking oral CBD which has significantly helped, not taking daily. She hasn't been taking the propranolol daily, but feels that it does help with her anxiety and heart rate.     She reports going to the dentist recently, but was noted to have elevated blood pressure and heart rate in the office. She was quite anxious at this time, had not taken the propranolol.

## 2020-02-13 NOTE — PROGRESS NOTES
Subjective:   Jadyn Mcgrath is a 24 y.o. female here today for the following:    Anxiety  Patient has been taking propranolol 20 mg 1-2 times daily as needed for situational anxiety. She feels that her anxiety has worsened and she is having more episodes. Usually occur when she is at the doctors office, dentist, when checking her blood pressure, and other socially stressful situations.     She has started taking oral CBD which has significantly helped, not taking daily. She hasn't been taking the propranolol daily, but feels that it does help with her anxiety and heart rate.     She reports going to the dentist recently, but was noted to have elevated blood pressure and heart rate in the office. She was quite anxious at this time, had not taken the propranolol.     Elevated blood pressure reading  Patient reports having elevated blood pressure readings at her doctors visits, dentist appointments, and generally when she checks her blood pressure. She states that all of these activities significantly worsen her anxiety, increase her heart rate and blood pressure. They are improved with her as needed propranolol but she has not been taking this every day, only at night before bed as needed as it helps her sleep or about 30 minutes before an appointment.     She has had headaches and intermittent chest pain during episodes of high blood pressure and elevated heart rate.     She does not snore when she sleeps. She has gained weight and is not exercising like she was previously. Her father has a history of HTN.          Current medicines (including changes today)  Current Outpatient Medications   Medication Sig Dispense Refill   • propranolol (INDERAL) 40 MG Tab Take 1 Tab by mouth 2 times a day. 60 Tab 6   • ibuprofen (MOTRIN) 800 MG Tab Take 1 Tab by mouth every 8 hours as needed. 30 Tab 0     No current facility-administered medications for this visit.      She  has a past medical history of Anxiety,  "Elevated hemoglobin A1c, Hyperlipidemia, Transaminitis, and Vitamin D deficiency.    ROS   + chest pain, no shortness of breath, no abdominal pain  Positive ROS as per HPI.  All other systems reviewed and are negative.     Objective:     /100 (BP Location: Left arm, Patient Position: Sitting, BP Cuff Size: Adult long)   Pulse 92   Temp 37.6 °C (99.6 °F) (Temporal)   Resp 16   Ht 1.6 m (5' 3\")   Wt 84.4 kg (186 lb)   SpO2 97%  Body mass index is 32.95 kg/m².     Physical Exam:  Constitutional: Alert, no distress.  Skin: Warm, dry, good turgor, no rashes in visible areas.  Eye: Equal, round, conjunctiva clear, lids normal.  ENMT: Lips without lesions, good dentition  Neck: Trachea midline  Respiratory: Unlabored respiratory effort  Cardiovascular: Normal S1, S2, no murmur, no edema.  Abdomen: Obese. Soft, non-tender, no masses, no hepatosplenomegaly.  Psych: Alert and oriented x3, normal affect and mood.        Assessment and Plan:   The following treatment plan was discussed    1. Elevated blood pressure reading  Unstable  Likely anxiety induced, difficult to evaluate as she gets very anxious to check her BP and therefore has elevated readings  Will attempt to control her anxiety first, then see how BP is after anxiety is well managed.   If still elevated will add additional medication  Check microalb  Increase propranolol to 40 mg BID for anxiety, tachycardia, and elevated BP  - MICROALBUMIN CREAT RATIO URINE; Future  - propranolol (INDERAL) 40 MG Tab; Take 1 Tab by mouth 2 times a day.  Dispense: 60 Tab; Refill: 6    2. Anxiety  Unstable  Increase propranolol to 40 mg BID, may need to increase to ER 60 mg once daily if tolerated.   If this is not improved, switch to SSRI  - propranolol (INDERAL) 40 MG Tab; Take 1 Tab by mouth 2 times a day.  Dispense: 60 Tab; Refill: 6      Followup: Return in about 1 year (around 2/12/2021) for Depression/Anxiety, Hypertension.    I have placed the below orders and " discussed them with an approved delegating provider. The MA is performing the below orders under the direction of Dr. Harman

## 2020-02-19 ENCOUNTER — OFFICE VISIT (OUTPATIENT)
Dept: MEDICAL GROUP | Facility: MEDICAL CENTER | Age: 25
End: 2020-02-19
Payer: COMMERCIAL

## 2020-02-19 VITALS
DIASTOLIC BLOOD PRESSURE: 94 MMHG | WEIGHT: 184 LBS | TEMPERATURE: 98.7 F | HEART RATE: 116 BPM | OXYGEN SATURATION: 97 % | SYSTOLIC BLOOD PRESSURE: 140 MMHG | BODY MASS INDEX: 32.6 KG/M2 | HEIGHT: 63 IN

## 2020-02-19 DIAGNOSIS — F41.9 ANXIETY: ICD-10-CM

## 2020-02-19 DIAGNOSIS — R03.0 ELEVATED BLOOD PRESSURE READING: ICD-10-CM

## 2020-02-19 PROCEDURE — 99214 OFFICE O/P EST MOD 30 MIN: CPT | Performed by: NURSE PRACTITIONER

## 2020-02-20 NOTE — ASSESSMENT & PLAN NOTE
Symptoms improved with increase in propranolol to 40 mg BID.     Still having some breakthrough anxiety but able to cope better.

## 2020-02-20 NOTE — PROGRESS NOTES
"Subjective:   Jadyn Mcgrath is a 24 y.o. female here today for 1 week follow up for BP and anxiety:    Anxiety  Symptoms improved with increase in propranolol to 40 mg BID.     Still having some breakthrough anxiety but able to cope better.     Elevated blood pressure reading  Blood pressure has also improved. Reading 107-148/72-98 in the morning and 121-163/88-90 in the afternoon.     Headaches have significantly improved. Denies dizziness, chest pain, vision changes.     Girlfriend reports that she does snore, does not report gasping or abnormal breathing.        Current medicines (including changes today)  Current Outpatient Medications   Medication Sig Dispense Refill   • propranolol (INDERAL) 40 MG Tab Take 1 Tab by mouth 2 times a day. 60 Tab 6   • ibuprofen (MOTRIN) 800 MG Tab Take 1 Tab by mouth every 8 hours as needed. 30 Tab 0     No current facility-administered medications for this visit.      She  has a past medical history of Anxiety, Elevated hemoglobin A1c, Hyperlipidemia, Transaminitis, and Vitamin D deficiency.    ROS  No chest pain, no shortness of breath, no abdominal pain  Positive ROS as per HPI.  All other systems reviewed and are negative.     Objective:     /94   Pulse (!) 116   Temp 37.1 °C (98.7 °F) (Temporal)   Ht 1.6 m (5' 3\")   Wt 83.5 kg (184 lb)   SpO2 97%  Body mass index is 32.59 kg/m².     Physical Exam:  Constitutional: Alert, no distress.  Skin: Warm, dry, good turgor, no rashes in visible areas.  Eye: Equal, round, conjunctiva clear, lids normal.  ENMT: Lips without lesions, good dentition  Neck: Trachea midline  Respiratory: Unlabored respiratory effort  Cardiovascular: No edema.  Abdomen: Obese  Psych: Alert and oriented x3, normal affect and mood.      Assessment and Plan:   The following treatment plan was discussed    1. Anxiety  Stable  Continue propranolol 40 mg BID  May need to increase to 60 mg BID vs switch to SSRI/SNRI    2. Elevated blood " pressure reading  Stable  Continue propranolol 40 mg BID  Continue checking blood pressure 1-2 times daily, keep log.  Advised patient to send me readings via my chart in 2 weeks to see if medication needs to be adjusted.  She verbalizes understanding.      Followup: Return in about 3 months (around 5/19/2020), or if symptoms worsen or fail to improve.    I have placed the below orders and discussed them with an approved delegating provider. The MA is performing the below orders under the direction of Dr. Harman

## 2020-02-20 NOTE — ASSESSMENT & PLAN NOTE
Blood pressure has also improved. Reading 107-148/72-98 in the morning and 121-163/88-90 in the afternoon.     Headaches have significantly improved. Denies dizziness, chest pain, vision changes.     Girlfriend reports that she does snore, does not report gasping or abnormal breathing.

## 2020-11-06 ENCOUNTER — HOSPITAL ENCOUNTER (OUTPATIENT)
Dept: LAB | Facility: MEDICAL CENTER | Age: 25
End: 2020-11-06
Payer: COMMERCIAL

## 2020-11-06 PROCEDURE — U0003 INFECTIOUS AGENT DETECTION BY NUCLEIC ACID (DNA OR RNA); SEVERE ACUTE RESPIRATORY SYNDROME CORONAVIRUS 2 (SARS-COV-2) (CORONAVIRUS DISEASE [COVID-19]), AMPLIFIED PROBE TECHNIQUE, MAKING USE OF HIGH THROUGHPUT TECHNOLOGIES AS DESCRIBED BY CMS-2020-01-R: HCPCS

## 2020-11-08 LAB
COVID ORDER STATUS COVID19: NORMAL
SARS-COV-2 RNA RESP QL NAA+PROBE: DETECTED
SPECIMEN SOURCE: ABNORMAL

## 2020-12-30 ENCOUNTER — TELEPHONE (OUTPATIENT)
Dept: MEDICAL GROUP | Facility: MEDICAL CENTER | Age: 25
End: 2020-12-30

## 2020-12-30 DIAGNOSIS — R73.03 PREDIABETES: ICD-10-CM

## 2020-12-30 DIAGNOSIS — R74.01 TRANSAMINITIS: ICD-10-CM

## 2020-12-30 DIAGNOSIS — E78.00 PURE HYPERCHOLESTEROLEMIA: ICD-10-CM

## 2020-12-30 DIAGNOSIS — R03.0 ELEVATED BLOOD PRESSURE READING: ICD-10-CM

## 2020-12-30 DIAGNOSIS — Z00.00 ANNUAL PHYSICAL EXAM: ICD-10-CM

## 2020-12-30 DIAGNOSIS — E55.9 VITAMIN D DEFICIENCY: ICD-10-CM

## 2021-09-01 ENCOUNTER — HOSPITAL ENCOUNTER (OUTPATIENT)
Dept: LAB | Facility: MEDICAL CENTER | Age: 26
End: 2021-09-01
Attending: NURSE PRACTITIONER
Payer: COMMERCIAL

## 2021-09-01 DIAGNOSIS — R03.0 ELEVATED BLOOD PRESSURE READING: ICD-10-CM

## 2021-09-01 DIAGNOSIS — Z00.00 ANNUAL PHYSICAL EXAM: ICD-10-CM

## 2021-09-01 DIAGNOSIS — R74.01 TRANSAMINITIS: ICD-10-CM

## 2021-09-01 DIAGNOSIS — E55.9 VITAMIN D DEFICIENCY: ICD-10-CM

## 2021-09-01 DIAGNOSIS — E78.00 PURE HYPERCHOLESTEROLEMIA: ICD-10-CM

## 2021-09-01 DIAGNOSIS — R73.03 PREDIABETES: ICD-10-CM

## 2021-09-01 LAB
25(OH)D3 SERPL-MCNC: 19 NG/ML (ref 30–100)
ALBUMIN SERPL BCP-MCNC: 4.2 G/DL (ref 3.2–4.9)
ALBUMIN/GLOB SERPL: 1.1 G/DL
ALP SERPL-CCNC: 112 U/L (ref 30–99)
ALT SERPL-CCNC: 209 U/L (ref 2–50)
ANION GAP SERPL CALC-SCNC: 15 MMOL/L (ref 7–16)
AST SERPL-CCNC: 101 U/L (ref 12–45)
BASOPHILS # BLD AUTO: 0.6 % (ref 0–1.8)
BASOPHILS # BLD: 0.05 K/UL (ref 0–0.12)
BILIRUB SERPL-MCNC: 0.8 MG/DL (ref 0.1–1.5)
BUN SERPL-MCNC: 11 MG/DL (ref 8–22)
CALCIUM SERPL-MCNC: 9.6 MG/DL (ref 8.4–10.2)
CHLORIDE SERPL-SCNC: 103 MMOL/L (ref 96–112)
CHOLEST SERPL-MCNC: 240 MG/DL (ref 100–199)
CO2 SERPL-SCNC: 21 MMOL/L (ref 20–33)
CREAT SERPL-MCNC: 0.61 MG/DL (ref 0.5–1.4)
CREAT UR-MCNC: 161.3 MG/DL
EOSINOPHIL # BLD AUTO: 0.19 K/UL (ref 0–0.51)
EOSINOPHIL NFR BLD: 2.4 % (ref 0–6.9)
ERYTHROCYTE [DISTWIDTH] IN BLOOD BY AUTOMATED COUNT: 40.4 FL (ref 35.9–50)
EST. AVERAGE GLUCOSE BLD GHB EST-MCNC: 117 MG/DL
FASTING STATUS PATIENT QL REPORTED: NORMAL
GLOBULIN SER CALC-MCNC: 3.8 G/DL (ref 1.9–3.5)
GLUCOSE SERPL-MCNC: 83 MG/DL (ref 65–99)
HBA1C MFR BLD: 5.7 % (ref 4–5.6)
HCT VFR BLD AUTO: 41.5 % (ref 37–47)
HDLC SERPL-MCNC: 43 MG/DL
HGB BLD-MCNC: 13.7 G/DL (ref 12–16)
IMM GRANULOCYTES # BLD AUTO: 0.02 K/UL (ref 0–0.11)
IMM GRANULOCYTES NFR BLD AUTO: 0.2 % (ref 0–0.9)
LDLC SERPL CALC-MCNC: 171 MG/DL
LYMPHOCYTES # BLD AUTO: 2.42 K/UL (ref 1–4.8)
LYMPHOCYTES NFR BLD: 30.1 % (ref 22–41)
MCH RBC QN AUTO: 28.1 PG (ref 27–33)
MCHC RBC AUTO-ENTMCNC: 33 G/DL (ref 33.6–35)
MCV RBC AUTO: 85 FL (ref 81.4–97.8)
MICROALBUMIN UR-MCNC: <1.2 MG/DL
MICROALBUMIN/CREAT UR: NORMAL MG/G (ref 0–30)
MONOCYTES # BLD AUTO: 0.42 K/UL (ref 0–0.85)
MONOCYTES NFR BLD AUTO: 5.2 % (ref 0–13.4)
NEUTROPHILS # BLD AUTO: 4.93 K/UL (ref 2–7.15)
NEUTROPHILS NFR BLD: 61.5 % (ref 44–72)
NRBC # BLD AUTO: 0 K/UL
NRBC BLD-RTO: 0 /100 WBC
PLATELET # BLD AUTO: 321 K/UL (ref 164–446)
PMV BLD AUTO: 8.8 FL (ref 9–12.9)
POTASSIUM SERPL-SCNC: 3.8 MMOL/L (ref 3.6–5.5)
PROT SERPL-MCNC: 8 G/DL (ref 6–8.2)
RBC # BLD AUTO: 4.88 M/UL (ref 4.2–5.4)
SODIUM SERPL-SCNC: 139 MMOL/L (ref 135–145)
TRIGL SERPL-MCNC: 131 MG/DL (ref 0–149)
TSH SERPL DL<=0.005 MIU/L-ACNC: 1.53 UIU/ML (ref 0.38–5.33)
WBC # BLD AUTO: 8 K/UL (ref 4.8–10.8)

## 2021-09-01 PROCEDURE — 82570 ASSAY OF URINE CREATININE: CPT

## 2021-09-01 PROCEDURE — 82306 VITAMIN D 25 HYDROXY: CPT

## 2021-09-01 PROCEDURE — 85025 COMPLETE CBC W/AUTO DIFF WBC: CPT

## 2021-09-01 PROCEDURE — 83036 HEMOGLOBIN GLYCOSYLATED A1C: CPT

## 2021-09-01 PROCEDURE — 84443 ASSAY THYROID STIM HORMONE: CPT

## 2021-09-01 PROCEDURE — 80053 COMPREHEN METABOLIC PANEL: CPT

## 2021-09-01 PROCEDURE — 80061 LIPID PANEL: CPT

## 2021-09-01 PROCEDURE — 36415 COLL VENOUS BLD VENIPUNCTURE: CPT

## 2021-09-01 PROCEDURE — 82043 UR ALBUMIN QUANTITATIVE: CPT

## 2021-09-09 ENCOUNTER — OFFICE VISIT (OUTPATIENT)
Dept: MEDICAL GROUP | Facility: MEDICAL CENTER | Age: 26
End: 2021-09-09
Payer: COMMERCIAL

## 2021-09-09 VITALS
OXYGEN SATURATION: 99 % | BODY MASS INDEX: 34.2 KG/M2 | TEMPERATURE: 99.4 F | HEIGHT: 63 IN | DIASTOLIC BLOOD PRESSURE: 78 MMHG | HEART RATE: 102 BPM | WEIGHT: 193 LBS | SYSTOLIC BLOOD PRESSURE: 124 MMHG

## 2021-09-09 DIAGNOSIS — R74.01 TRANSAMINITIS: ICD-10-CM

## 2021-09-09 DIAGNOSIS — E66.9 OBESITY (BMI 30-39.9): ICD-10-CM

## 2021-09-09 DIAGNOSIS — E78.00 PURE HYPERCHOLESTEROLEMIA: ICD-10-CM

## 2021-09-09 DIAGNOSIS — F41.9 ANXIETY: ICD-10-CM

## 2021-09-09 DIAGNOSIS — R73.03 PREDIABETES: ICD-10-CM

## 2021-09-09 PROCEDURE — 99214 OFFICE O/P EST MOD 30 MIN: CPT | Performed by: NURSE PRACTITIONER

## 2021-09-09 RX ORDER — PROPRANOLOL HYDROCHLORIDE 20 MG/1
20-40 TABLET ORAL 2 TIMES DAILY PRN
Qty: 30 TABLET | Refills: 2 | Status: SHIPPED | OUTPATIENT
Start: 2021-09-09 | End: 2023-01-13 | Stop reason: SDUPTHER

## 2021-09-09 SDOH — ECONOMIC STABILITY: HOUSING INSECURITY: IN THE LAST 12 MONTHS, HOW MANY PLACES HAVE YOU LIVED?: 1

## 2021-09-09 SDOH — HEALTH STABILITY: PHYSICAL HEALTH: ON AVERAGE, HOW MANY MINUTES DO YOU ENGAGE IN EXERCISE AT THIS LEVEL?: 30 MIN

## 2021-09-09 SDOH — ECONOMIC STABILITY: FOOD INSECURITY: WITHIN THE PAST 12 MONTHS, THE FOOD YOU BOUGHT JUST DIDN'T LAST AND YOU DIDN'T HAVE MONEY TO GET MORE.: NEVER TRUE

## 2021-09-09 SDOH — ECONOMIC STABILITY: FOOD INSECURITY: WITHIN THE PAST 12 MONTHS, YOU WORRIED THAT YOUR FOOD WOULD RUN OUT BEFORE YOU GOT MONEY TO BUY MORE.: NEVER TRUE

## 2021-09-09 SDOH — HEALTH STABILITY: PHYSICAL HEALTH: ON AVERAGE, HOW MANY DAYS PER WEEK DO YOU ENGAGE IN MODERATE TO STRENUOUS EXERCISE (LIKE A BRISK WALK)?: 1 DAY

## 2021-09-09 SDOH — ECONOMIC STABILITY: TRANSPORTATION INSECURITY
IN THE PAST 12 MONTHS, HAS THE LACK OF TRANSPORTATION KEPT YOU FROM MEDICAL APPOINTMENTS OR FROM GETTING MEDICATIONS?: NO

## 2021-09-09 SDOH — ECONOMIC STABILITY: TRANSPORTATION INSECURITY
IN THE PAST 12 MONTHS, HAS LACK OF RELIABLE TRANSPORTATION KEPT YOU FROM MEDICAL APPOINTMENTS, MEETINGS, WORK OR FROM GETTING THINGS NEEDED FOR DAILY LIVING?: NO

## 2021-09-09 SDOH — ECONOMIC STABILITY: INCOME INSECURITY: HOW HARD IS IT FOR YOU TO PAY FOR THE VERY BASICS LIKE FOOD, HOUSING, MEDICAL CARE, AND HEATING?: NOT VERY HARD

## 2021-09-09 SDOH — ECONOMIC STABILITY: INCOME INSECURITY: IN THE LAST 12 MONTHS, WAS THERE A TIME WHEN YOU WERE NOT ABLE TO PAY THE MORTGAGE OR RENT ON TIME?: NO

## 2021-09-09 SDOH — ECONOMIC STABILITY: HOUSING INSECURITY
IN THE LAST 12 MONTHS, WAS THERE A TIME WHEN YOU DID NOT HAVE A STEADY PLACE TO SLEEP OR SLEPT IN A SHELTER (INCLUDING NOW)?: NO

## 2021-09-09 SDOH — HEALTH STABILITY: MENTAL HEALTH
STRESS IS WHEN SOMEONE FEELS TENSE, NERVOUS, ANXIOUS, OR CAN'T SLEEP AT NIGHT BECAUSE THEIR MIND IS TROUBLED. HOW STRESSED ARE YOU?: VERY MUCH

## 2021-09-09 SDOH — ECONOMIC STABILITY: TRANSPORTATION INSECURITY
IN THE PAST 12 MONTHS, HAS LACK OF TRANSPORTATION KEPT YOU FROM MEETINGS, WORK, OR FROM GETTING THINGS NEEDED FOR DAILY LIVING?: NO

## 2021-09-09 ASSESSMENT — SOCIAL DETERMINANTS OF HEALTH (SDOH)
HOW OFTEN DO YOU ATTEND CHURCH OR RELIGIOUS SERVICES?: 1 TO 4 TIMES PER YEAR
HOW HARD IS IT FOR YOU TO PAY FOR THE VERY BASICS LIKE FOOD, HOUSING, MEDICAL CARE, AND HEATING?: NOT VERY HARD
HOW OFTEN DO YOU GET TOGETHER WITH FRIENDS OR RELATIVES?: MORE THAN THREE TIMES A WEEK
ARE YOU MARRIED, WIDOWED, DIVORCED, SEPARATED, NEVER MARRIED, OR LIVING WITH A PARTNER?: NEVER MARRIED
DO YOU BELONG TO ANY CLUBS OR ORGANIZATIONS SUCH AS CHURCH GROUPS UNIONS, FRATERNAL OR ATHLETIC GROUPS, OR SCHOOL GROUPS?: NO
HOW OFTEN DO YOU ATTENT MEETINGS OF THE CLUB OR ORGANIZATION YOU BELONG TO?: MORE THAN 4 TIMES PER YEAR
ARE YOU MARRIED, WIDOWED, DIVORCED, SEPARATED, NEVER MARRIED, OR LIVING WITH A PARTNER?: NEVER MARRIED
WITHIN THE PAST 12 MONTHS, YOU WORRIED THAT YOUR FOOD WOULD RUN OUT BEFORE YOU GOT THE MONEY TO BUY MORE: NEVER TRUE
HOW OFTEN DO YOU ATTEND CHURCH OR RELIGIOUS SERVICES?: 1 TO 4 TIMES PER YEAR
DO YOU BELONG TO ANY CLUBS OR ORGANIZATIONS SUCH AS CHURCH GROUPS UNIONS, FRATERNAL OR ATHLETIC GROUPS, OR SCHOOL GROUPS?: NO
HOW MANY DRINKS CONTAINING ALCOHOL DO YOU HAVE ON A TYPICAL DAY WHEN YOU ARE DRINKING: 3 OR 4
HOW OFTEN DO YOU HAVE A DRINK CONTAINING ALCOHOL: MONTHLY OR LESS
HOW OFTEN DO YOU HAVE SIX OR MORE DRINKS ON ONE OCCASION: LESS THAN MONTHLY
HOW OFTEN DO YOU GET TOGETHER WITH FRIENDS OR RELATIVES?: MORE THAN THREE TIMES A WEEK
IN A TYPICAL WEEK, HOW MANY TIMES DO YOU TALK ON THE PHONE WITH FAMILY, FRIENDS, OR NEIGHBORS?: MORE THAN THREE TIMES A WEEK
IN A TYPICAL WEEK, HOW MANY TIMES DO YOU TALK ON THE PHONE WITH FAMILY, FRIENDS, OR NEIGHBORS?: MORE THAN THREE TIMES A WEEK
HOW OFTEN DO YOU ATTENT MEETINGS OF THE CLUB OR ORGANIZATION YOU BELONG TO?: MORE THAN 4 TIMES PER YEAR

## 2021-09-09 ASSESSMENT — LIFESTYLE VARIABLES
HOW OFTEN DO YOU HAVE SIX OR MORE DRINKS ON ONE OCCASION: LESS THAN MONTHLY
HOW MANY STANDARD DRINKS CONTAINING ALCOHOL DO YOU HAVE ON A TYPICAL DAY: 3 OR 4
HOW OFTEN DO YOU HAVE A DRINK CONTAINING ALCOHOL: MONTHLY OR LESS

## 2021-09-09 ASSESSMENT — PATIENT HEALTH QUESTIONNAIRE - PHQ9
SUM OF ALL RESPONSES TO PHQ QUESTIONS 1-9: 9
CLINICAL INTERPRETATION OF PHQ2 SCORE: 3
5. POOR APPETITE OR OVEREATING: 1 - SEVERAL DAYS

## 2021-09-09 ASSESSMENT — FIBROSIS 4 INDEX: FIB4 SCORE: 0.59

## 2021-09-09 NOTE — PROGRESS NOTES
"Subjective:   Jadyn Mcgrath is a 26 y.o. female here today for lab review    Transaminitis  Chronic. Worse on recent labs. Patient reports that she has not been eating well or exercising but plans to change lifestyle habits again.     Liver US in 2018 was normal.     Prediabetes  Previously resolved with diet and exercise.  Does have a family history of diabetes in her father. Diet has been poor, planning on making changes.     Pure hypercholesterolemia  Chronic. LDL back up to 171, diet has been poor. Previously improved with dietary changes.        Current medicines (including changes today)  Current Outpatient Medications   Medication Sig Dispense Refill   • propranolol (INDERAL) 20 MG Tab Take 1-2 Tablets by mouth 2 times a day as needed. 30 Tablet 2     No current facility-administered medications for this visit.     She  has a past medical history of Anxiety, Elevated hemoglobin A1c, Hyperlipidemia, Transaminitis, and Vitamin D deficiency.    ROS   No chest pain, no shortness of breath, no abdominal pain  Positive ROS as per HPI.  All other systems reviewed and are negative.     Objective:     /78 (BP Location: Right arm, Patient Position: Sitting, BP Cuff Size: Adult)   Pulse (!) 102   Temp 37.4 °C (99.4 °F) (Temporal)   Ht 1.6 m (5' 3\")   Wt 87.5 kg (193 lb)   SpO2 99%  Body mass index is 34.19 kg/m².     Physical Exam:  Constitutional: Alert, no distress.  Skin: Warm, dry, good turgor, no rashes in visible areas.  Eye: Equal, round and reactive, conjunctiva clear, lids normal.  ENMT: Mask in place  Respiratory: Unlabored respiratory effort  Psych: Alert and oriented x3, normal affect and mood.        Assessment and Plan:   The following treatment plan was discussed    1. Transaminitis  Unstable  Dietary changes discussed  Repeat labs in 3 months  Follow up with Gi  - Comp Metabolic Panel; Future  - REFERRAL TO GASTROENTEROLOGY    2. Prediabetes  Unstable  Diet and exercise  Repeat " 3 months  - HEMOGLOBIN A1C; Future  - REFERRAL TO GASTROENTEROLOGY    3. Pure hypercholesterolemia  Unstable  Diet and exercise  - Lipid Profile; Future  - REFERRAL TO GASTROENTEROLOGY    4. Anxiety  Stable  Continue propranolol as needed, refilled  - propranolol (INDERAL) 20 MG Tab; Take 1-2 Tablets by mouth 2 times a day as needed.  Dispense: 30 Tablet; Refill: 2      Followup: Return in about 3 months (around 12/9/2021) for Pap Smear.    I have placed the below orders and discussed them with an approved delegating provider. The MA is performing the below orders under the direction of Dr. Vargas

## 2021-09-09 NOTE — ASSESSMENT & PLAN NOTE
Chronic. Worse on recent labs. Patient reports that she has not been eating well or exercising but plans to change lifestyle habits again.     Liver US in 2018 was normal.

## 2021-09-09 NOTE — ASSESSMENT & PLAN NOTE
Previously resolved with diet and exercise.  Does have a family history of diabetes in her father. Diet has been poor, planning on making changes.

## 2021-12-09 ENCOUNTER — APPOINTMENT (OUTPATIENT)
Dept: MEDICAL GROUP | Facility: MEDICAL CENTER | Age: 26
End: 2021-12-09
Payer: COMMERCIAL

## 2022-05-04 ENCOUNTER — HOSPITAL ENCOUNTER (OUTPATIENT)
Facility: MEDICAL CENTER | Age: 27
End: 2022-05-04
Attending: NURSE PRACTITIONER
Payer: COMMERCIAL

## 2022-05-04 ENCOUNTER — OFFICE VISIT (OUTPATIENT)
Dept: MEDICAL GROUP | Facility: MEDICAL CENTER | Age: 27
End: 2022-05-04
Payer: COMMERCIAL

## 2022-05-04 VITALS
HEIGHT: 63 IN | OXYGEN SATURATION: 98 % | HEART RATE: 87 BPM | SYSTOLIC BLOOD PRESSURE: 132 MMHG | BODY MASS INDEX: 34.2 KG/M2 | WEIGHT: 193 LBS | DIASTOLIC BLOOD PRESSURE: 82 MMHG | TEMPERATURE: 98.1 F

## 2022-05-04 DIAGNOSIS — E55.9 VITAMIN D DEFICIENCY: ICD-10-CM

## 2022-05-04 DIAGNOSIS — R79.89 ELEVATED TESTOSTERONE LEVEL IN FEMALE: ICD-10-CM

## 2022-05-04 DIAGNOSIS — Z12.4 PAP SMEAR FOR CERVICAL CANCER SCREENING: ICD-10-CM

## 2022-05-04 PROCEDURE — 87491 CHLMYD TRACH DNA AMP PROBE: CPT

## 2022-05-04 PROCEDURE — 88175 CYTOPATH C/V AUTO FLUID REDO: CPT

## 2022-05-04 PROCEDURE — 87591 N.GONORRHOEAE DNA AMP PROB: CPT

## 2022-05-04 PROCEDURE — 99395 PREV VISIT EST AGE 18-39: CPT | Performed by: NURSE PRACTITIONER

## 2022-05-04 RX ORDER — ERGOCALCIFEROL 1.25 MG/1
50000 CAPSULE ORAL
Qty: 12 CAPSULE | Refills: 0 | Status: SHIPPED | OUTPATIENT
Start: 2022-05-04 | End: 2023-10-11

## 2022-05-04 ASSESSMENT — FIBROSIS 4 INDEX: FIB4 SCORE: 0.59

## 2022-05-04 ASSESSMENT — PATIENT HEALTH QUESTIONNAIRE - PHQ9: CLINICAL INTERPRETATION OF PHQ2 SCORE: 0

## 2022-05-04 NOTE — PROGRESS NOTES
SUBJECTIVE: 26 y.o. female for annual routine gynecologic exam  No chief complaint on file.      OB History    Para Term  AB Living   0 0 0 0 0 0   SAB IAB Ectopic Molar Multiple Live Births   0 0 0 0 0 0      Last Pap: 2017  Social History     Substance and Sexual Activity   Sexual Activity Yes   • Partners: Female   • Birth control/protection: Other-See Comments    Comment: female partner     Sexual history: currently sexually active, single partner, homosexual, relationship with woman.    H/O Abnormal Pap no  She  reports that she has never smoked. She has never used smokeless tobacco.        Allergies: Patient has no known allergies.     ROS:    Reports no menopause symptoms of hot flashes, night sweats, sleep disruption, mood changes.Denies vaginal dryness.   Menses variable with 5 days moderate bleeding   Cramping is moderate.   She does not take OTC analgesics for cramping  No significant bloating/fluid retention, pelvic pain, or dyspareunia. No vaginal discharge   No breast tenderness, dimpling, mass, nipple discharge, skin changes, changes in size or contour, or abnormal cyclic discomfort.  No urinary tract symptoms, no incontinence, no polydipsia, polyuria,  No abdominal pain, change in bowel habits, black or bloody stools.    No unusual headaches, no visual changes, menstrual migraines   No prolonged cough. No dyspnea or chest pain on exertion.  No depression, labile mood, anxiety, libido changes, insomnia.  No temperature intolerance.  No new/concerning skin lesions, concerns.     Current medicines (including changes today)  Current Outpatient Medications   Medication Sig Dispense Refill   • propranolol (INDERAL) 20 MG Tab Take 1-2 Tablets by mouth 2 times a day as needed. 30 Tablet 2     No current facility-administered medications for this visit.     She  has a past medical history of Anxiety, Elevated hemoglobin A1c, Hyperlipidemia, Transaminitis, and Vitamin D deficiency.  She  has no  "past surgical history on file.     Family History:   Family History   Problem Relation Age of Onset   • Diabetes Father        OBJECTIVE:   /82   Pulse 87   Temp 36.7 °C (98.1 °F) (Temporal)   Ht 1.6 m (5' 3\")   Wt 87.5 kg (193 lb)   SpO2 98%   BMI 34.19 kg/m²   Body mass index is 34.19 kg/m².    General/Constitutional: Vitals as above, Well nourished, well developed female in no acute distress  HEAD AND NECK:  Ears normal.  Throat, oral cavity and tongue normal.  Neck supple. No adenopathy or masses in the neck or supraclavicular regions. No thyromegaly.   PSYCH: Judgment grossly appropriate, no apparent depression/anxiety  CHEST:  Clear, good air entry, no wheezes or rales.   HEART:  Regular rate and rhythm.  S1 and S2 normal. No edema  ABDOMEN:  Soft without tenderness, guarding, mass or organomegaly.  No CVA tenderness or inguinal adenopathy.   Genitourinary: Grossly normal external female genitalia with skin within normal limits,   EXTREMITIES:  Extremities and peripheral pulses are normal.   SKIN: color normal, vascularity normal, no edema, temperature normal   No rashes or suspicious skin lesions noted.     Breast Exam: Symmetrical, normal consistency without masses., No dimpling or skin changes, Normal nipples without discharge, no axillary lymphadenopathy  Pelvic Exam -  Normal external genitalia with no lesions. Vaginal Mucosa:  normal vaginal mucosa, normal discharge . no adnexal masses or tenderness, no cervical motion tenderness, no uterine tenderness, speculum used with warm water as lubrication, Cervix well visualized with no discharge/friability/bleeding, cervical broom used to obtain cervical specimen, Cervix with no visible lesions. Thin Prep Pap is obtained, vaginal swab is not obtained and specimen(s) sent to lab    <ASSESSMENT and PLAN>  1. Pap smear for cervical cancer screening  Pap collected, call with results  THINPREP RFLX HPV ASCUS W/CTNG   2. Vitamin D deficiency  " Unstable  Not taking supplement  D2 weekly for 12 weeks, then D3 2000 IU dialy  ergocalciferol (DRISDOL) 76577 UNIT capsule   3. Elevated testosterone level in female  Unstable  Having irregular menses, hirsutism, acne  Repeat testosterone, consider COCP, metformin, spironolactone  TESTOSTERONE F&T FEMALES/CHILD       Discussed  breast self exam, adequate intake of calcium and vitamin D, diet and exercise   Follow-up in 3 years for next Gyn exam and 3 years for next Pap.   Next office visit for recheck of chronic medical conditions is due in 6 months    I have placed the below orders and discussed them with an approved delegating provider. The MA is performing the below orders under the direction of Dr. Vargas

## 2022-05-05 DIAGNOSIS — Z12.4 PAP SMEAR FOR CERVICAL CANCER SCREENING: ICD-10-CM

## 2022-11-29 ENCOUNTER — OFFICE VISIT (OUTPATIENT)
Dept: URGENT CARE | Facility: CLINIC | Age: 27
End: 2022-11-29
Payer: COMMERCIAL

## 2022-11-29 VITALS
HEART RATE: 112 BPM | OXYGEN SATURATION: 98 % | TEMPERATURE: 99.2 F | WEIGHT: 198 LBS | SYSTOLIC BLOOD PRESSURE: 124 MMHG | RESPIRATION RATE: 18 BRPM | HEIGHT: 64 IN | DIASTOLIC BLOOD PRESSURE: 76 MMHG | BODY MASS INDEX: 33.8 KG/M2

## 2022-11-29 DIAGNOSIS — R51.9 ACUTE NONINTRACTABLE HEADACHE, UNSPECIFIED HEADACHE TYPE: ICD-10-CM

## 2022-11-29 DIAGNOSIS — B97.89 VIRAL RESPIRATORY ILLNESS: ICD-10-CM

## 2022-11-29 DIAGNOSIS — J98.8 VIRAL RESPIRATORY ILLNESS: ICD-10-CM

## 2022-11-29 LAB
EXTERNAL QUALITY CONTROL: NORMAL
INT CON NEG: NORMAL
INT CON POS: NORMAL
SARS-COV+SARS-COV-2 AG RESP QL IA.RAPID: NEGATIVE

## 2022-11-29 PROCEDURE — 87426 SARSCOV CORONAVIRUS AG IA: CPT | Performed by: PHYSICIAN ASSISTANT

## 2022-11-29 PROCEDURE — 99213 OFFICE O/P EST LOW 20 MIN: CPT | Performed by: PHYSICIAN ASSISTANT

## 2022-11-29 RX ORDER — KETOROLAC TROMETHAMINE 30 MG/ML
30 INJECTION, SOLUTION INTRAMUSCULAR; INTRAVENOUS ONCE
Status: COMPLETED | OUTPATIENT
Start: 2022-11-29 | End: 2022-11-29

## 2022-11-29 RX ORDER — ONDANSETRON 4 MG/1
4 TABLET, ORALLY DISINTEGRATING ORAL ONCE
Status: COMPLETED | OUTPATIENT
Start: 2022-11-29 | End: 2022-11-29

## 2022-11-29 RX ADMIN — KETOROLAC TROMETHAMINE 30 MG: 30 INJECTION, SOLUTION INTRAMUSCULAR; INTRAVENOUS at 16:12

## 2022-11-29 RX ADMIN — ONDANSETRON 4 MG: 4 TABLET, ORALLY DISINTEGRATING ORAL at 16:13

## 2022-11-29 ASSESSMENT — ENCOUNTER SYMPTOMS
COUGH: 1
SHORTNESS OF BREATH: 0
HEADACHES: 1
VOMITING: 0
NAUSEA: 1
FEVER: 0
EYE DISCHARGE: 0
SORE THROAT: 1
DIARRHEA: 0
EYE REDNESS: 0
MYALGIAS: 1

## 2022-11-29 ASSESSMENT — FIBROSIS 4 INDEX: FIB4 SCORE: 0.61

## 2022-11-29 NOTE — PROGRESS NOTES
Subjective     Jadyn Mcgrath is a 27 y.o. female who presents with Cough (X5 days, head cold ), Runny Nose, and Headache        URI   This is a new problem. Episode onset: x 5 days ago. The problem has been gradually worsening. There has been no fever. Associated symptoms include congestion, coughing, headaches (The patient states she developed a worsening, migraine like headache earlier today with associated nausea.), nausea and a sore throat. Pertinent negatives include no chest pain, diarrhea, ear pain or vomiting. She has tried NSAIDs (and OTC DayQuil and NyQuil) for the symptoms.     The patient reports possible exposure to COVID-19.  The patient states a coworker recently tested positive for COVID-19.  The patient reports no known exposure to influenza.    PMH:  has a past medical history of Anxiety, Elevated hemoglobin A1c, Hyperlipidemia, Transaminitis, and Vitamin D deficiency.  MEDS:   Current Outpatient Medications:     ergocalciferol (DRISDOL) 69371 UNIT capsule, Take 1 Capsule by mouth every 7 days. (Patient not taking: Reported on 11/29/2022), Disp: 12 Capsule, Rfl: 0    propranolol (INDERAL) 20 MG Tab, Take 1-2 Tablets by mouth 2 times a day as needed. (Patient not taking: Reported on 11/29/2022), Disp: 30 Tablet, Rfl: 2  ALLERGIES: No Known Allergies  SURGHX: No past surgical history on file.  SOCHX:  reports that she has never smoked. She has never used smokeless tobacco. She reports current alcohol use. She reports that she does not use drugs.  FH: Family history was reviewed, no pertinent findings to report    Review of Systems   Constitutional:  Negative for fever.   HENT:  Positive for congestion and sore throat. Negative for ear pain.    Eyes:  Negative for discharge and redness.   Respiratory:  Positive for cough. Negative for shortness of breath.    Cardiovascular:  Negative for chest pain.   Gastrointestinal:  Positive for nausea. Negative for diarrhea and vomiting.  "  Musculoskeletal:  Positive for myalgias.   Neurological:  Positive for headaches (The patient states she developed a worsening, migraine like headache earlier today with associated nausea.).            Objective     /76   Pulse (!) 112   Temp 37.3 °C (99.2 °F) (Temporal)   Resp 18   Ht 1.626 m (5' 4\")   Wt 89.8 kg (198 lb)   SpO2 98%   BMI 33.99 kg/m²      Physical Exam  Constitutional:       General: She is not in acute distress.     Appearance: Normal appearance. She is not ill-appearing.   HENT:      Head: Normocephalic and atraumatic.      Right Ear: Tympanic membrane, ear canal and external ear normal.      Left Ear: Tympanic membrane, ear canal and external ear normal.      Nose: Nose normal.      Mouth/Throat:      Mouth: Mucous membranes are moist.      Pharynx: Oropharynx is clear. No posterior oropharyngeal erythema.   Eyes:      Extraocular Movements: Extraocular movements intact.      Conjunctiva/sclera: Conjunctivae normal.   Cardiovascular:      Rate and Rhythm: Regular rhythm. Tachycardia present.      Heart sounds: Normal heart sounds.   Pulmonary:      Effort: Pulmonary effort is normal. No respiratory distress.      Breath sounds: Normal breath sounds. No wheezing.   Musculoskeletal:         General: Normal range of motion.      Cervical back: Normal range of motion and neck supple.   Skin:     General: Skin is warm and dry.   Neurological:      Mental Status: She is alert and oriented to person, place, and time.          Progress:  POCT Rapid COVID-19: NEGATIVE     Toradol 30mg IM given in clinic.   Zofran 4mg ODT given in clinic.   The patient tolerated the above medications without immediate side effects.           Assessment & Plan          1. Viral respiratory illness  - POCT SARS-COV Antigen EYAD (Symptomatic only)    2. Acute nonintractable headache, unspecified headache type  - ketorolac (TORADOL) injection 30 mg  - ondansetron (ZOFRAN ODT) dispertab 4 mg    The patient's " presenting symptoms and physical exam endings are consistent with a viral respiratory illness.  The patient is experiencing an associated headache related to her current viral illness.  The patient's physical exam today in clinic was normal, with the exception of an elevated heart rate.  The patient is nontoxic and appears in no acute distress.  The patient's vital signs are stable and within normal limits, with the exception of her elevated heart rate as previously mentioned.  She is afebrile today in clinic.  Discussed likely viral etiology with patient.  The patient's POCT COVID-19 testing in clinic was negative.  Advised patient to monitor for worsening signs or symptoms.  The patient was given Toradol 30 mg IM and Zofran 4 mg ODT in clinic for her acute headache.  Recommend OTC medications and supportive care for symptomatic management.  Recommend patient follow-up with her PCP as needed.  Discussed return precautions with the patient, and she verbalized understanding.    OTC Tylenol or Motrin for fever/discomfort.  OTC cough/cold medication for symptomatic relief  OTC Supportive Care for Congestion - saline nasal spray or neti pot  Drink plenty of fluids  Follow-up with PCP  Return to clinic or go to the ED if symptoms worsen or fail to improve, or if the patient should develop worsening/increasing cough, congestion, ear pain, sore throat, shortness of breath, wheezing, chest pain, fever/chills, and/or any concerning symptoms.    Discussed plan with the patient, and she agrees to the above.     I personally reviewed prior external notes and test results pertinent to today's visit.  I have independently reviewed and interpreted all diagnostics ordered during this urgent care visit.     Please note that this dictation was created using voice recognition software. I have made every reasonable attempt to correct obvious errors, but I expect that there may be errors of grammar and possibly content that I did not  discover before finalizing the note.     This note was electronically signed by Mami Etienne PA-C

## 2023-07-05 ENCOUNTER — OFFICE VISIT (OUTPATIENT)
Dept: URGENT CARE | Facility: CLINIC | Age: 28
End: 2023-07-05
Payer: COMMERCIAL

## 2023-07-05 VITALS
TEMPERATURE: 98 F | RESPIRATION RATE: 20 BRPM | OXYGEN SATURATION: 93 % | BODY MASS INDEX: 34.28 KG/M2 | SYSTOLIC BLOOD PRESSURE: 130 MMHG | DIASTOLIC BLOOD PRESSURE: 80 MMHG | HEART RATE: 78 BPM | HEIGHT: 64 IN | WEIGHT: 200.8 LBS

## 2023-07-05 DIAGNOSIS — M54.50 ACUTE LOW BACK PAIN, UNSPECIFIED BACK PAIN LATERALITY, UNSPECIFIED WHETHER SCIATICA PRESENT: ICD-10-CM

## 2023-07-05 LAB
APPEARANCE UR: NORMAL
BILIRUB UR STRIP-MCNC: NEGATIVE MG/DL
COLOR UR AUTO: NORMAL
GLUCOSE UR STRIP.AUTO-MCNC: NEGATIVE MG/DL
KETONES UR STRIP.AUTO-MCNC: NEGATIVE MG/DL
LEUKOCYTE ESTERASE UR QL STRIP.AUTO: NEGATIVE
NITRITE UR QL STRIP.AUTO: NEGATIVE
PH UR STRIP.AUTO: 5.5 [PH] (ref 5–8)
PROT UR QL STRIP: NEGATIVE MG/DL
RBC UR QL AUTO: NEGATIVE
SP GR UR STRIP.AUTO: 1.03
UROBILINOGEN UR STRIP-MCNC: NORMAL MG/DL

## 2023-07-05 PROCEDURE — 3079F DIAST BP 80-89 MM HG: CPT | Performed by: NURSE PRACTITIONER

## 2023-07-05 PROCEDURE — 3075F SYST BP GE 130 - 139MM HG: CPT | Performed by: NURSE PRACTITIONER

## 2023-07-05 PROCEDURE — 81002 URINALYSIS NONAUTO W/O SCOPE: CPT | Performed by: NURSE PRACTITIONER

## 2023-07-05 PROCEDURE — 99214 OFFICE O/P EST MOD 30 MIN: CPT | Performed by: NURSE PRACTITIONER

## 2023-07-05 RX ORDER — CYCLOBENZAPRINE HCL 5 MG
5-10 TABLET ORAL 3 TIMES DAILY PRN
Qty: 20 TABLET | Refills: 0 | Status: SHIPPED | OUTPATIENT
Start: 2023-07-05 | End: 2024-02-01

## 2023-07-05 RX ORDER — KETOROLAC TROMETHAMINE 30 MG/ML
30 INJECTION, SOLUTION INTRAMUSCULAR; INTRAVENOUS ONCE
Status: COMPLETED | OUTPATIENT
Start: 2023-07-05 | End: 2023-07-05

## 2023-07-05 RX ADMIN — KETOROLAC TROMETHAMINE 30 MG: 30 INJECTION, SOLUTION INTRAMUSCULAR; INTRAVENOUS at 08:50

## 2023-07-05 ASSESSMENT — FIBROSIS 4 INDEX: FIB4 SCORE: 0.63

## 2023-07-05 NOTE — PROGRESS NOTES
Jadyn Mcgrath is a 28 y.o. female who presents for Back Pain (Lower back )      HPI  This is a new problem. Jadyn Mcgrath is a 28 y.o. patient who presents to urgent care with c/o: 2 days of upper back pain -started on left posterior shoulder and now entire upper back. Feels that she slept wrong the day before the pain started. Pain started first thing in the morning and she had fallen asleep with 2 pillows.  Pain currently 10/10.  Denies numbness, tingling, weakness, neck pain, trauma, dysuria, flank pain, NVD, or unusual activity. Treatments tried: balm OTC.  Advil last night.  No other aggravating or alleviating factors.           ROS See HPI    Allergies:     No Known Allergies    PMSFS Hx:  Past Medical History:   Diagnosis Date    Anxiety     Elevated hemoglobin A1c     Hyperlipidemia     Transaminitis     Vitamin D deficiency      History reviewed. No pertinent surgical history.  Family History   Problem Relation Age of Onset    Diabetes Father      Social History     Tobacco Use    Smoking status: Never    Smokeless tobacco: Never   Vaping Use    Vaping Use: Never used   Substance Use Topics    Alcohol use: Yes     Comment: rare       Problems:   Patient Active Problem List   Diagnosis    Transaminitis    Vitamin D deficiency    Change in facial mole    Pure hypercholesterolemia    Anxiety    Recurrent tonsillitis    Tonsillolith    Prediabetes    Elevated testosterone level in female    Elevated blood pressure reading    Obesity (BMI 30-39.9)       Medications:   Current Outpatient Medications on File Prior to Visit   Medication Sig Dispense Refill    propranolol (INDERAL) 20 MG Tab Take 1-2 Tablets by mouth 2 times a day as needed (anxiety). 30 Tablet 2    ergocalciferol (DRISDOL) 82504 UNIT capsule Take 1 Capsule by mouth every 7 days. (Patient not taking: Reported on 11/29/2022) 12 Capsule 0     No current facility-administered medications on file prior to visit.           [facility] Cardiothoracic Surgery  Progress Note    1/27/2020 2:25 PM  Surgeon: Ronan Hitchcock MD  POD # 2  S/P: CABG     Subjective:  Mr. Melonie Miller no complaints. Objective:  /75   Pulse 78   Temp 97.7 °F (36.5 °C) (Temporal)   Resp 16   Ht 5' 7\" (1.702 m)   Wt 169 lb 11.2 oz (77 kg)   SpO2 94%   BMI 26.58 kg/m²   Chest: pacing wires: yes, chest tubes:yes, air leak no  CV: no murmur noted, Normal S1, S2, NSR  Lungs: atelectasis bibasilar  Abd: normal bowel sounds   Lower Extremities: trace edema  Incisions: covered    Labs:   CBC:   Recent Labs     01/25/20  1308 01/26/20  0400 01/27/20  0601   WBC 11.4* 11.4* 12.6*   HGB 10.0* 9.7* 9.8*   HCT 31.8* 31.3* 32.0*   MCV 93.0 93.4 95.0   * 98* 109*     BMP:   Recent Labs     01/25/20  0442  01/25/20  2030 01/26/20  0400 01/27/20  0601      < > 142 144 142   K 4.9   < > 4.5 4.6 4.7      < > 111* 113* 108*   CO2 23   < > 18* 19* 21   PHOS 4.6*  --   --  5.4* 6.3*   BUN 55*   < > 43* 44* 51*   CREATININE 2.52*   < > 2.35* 2.42* 3.08*    < > = values in this interval not displayed. I/O: I/O last 3 completed shifts:   In: 1033 [I.V.:983; IV Piggyback:50]  Out: 5564 [Urine:675; Drains:570]     Scheduled Meds:   metoprolol tartrate  12.5 mg Oral BID    atorvastatin  40 mg Oral Nightly    lubiprostone  8 mcg Oral BID WC    sodium bicarbonate  1,300 mg Oral BID    sodium chloride flush  10 mL Intravenous 2 times per day    pantoprazole  40 mg Intravenous Daily    And    sodium chloride (PF)  10 mL Intravenous Daily    aspirin  81 mg Oral Daily    clopidogrel  75 mg Oral Daily    docusate sodium  100 mg Oral BID    insulin lispro  0-12 Units Subcutaneous TID WC    insulin lispro  0-6 Units Subcutaneous Nightly    senna  1 tablet Oral BID    oxybutynin  5 mg Oral TID    tamsulosin  0.4 mg Oral Daily     Continuous Infusions:   norepinephrine      EPINEPHrine infusion      nitroGLYCERIN      insulin Stopped (01/26/20 0900)    "Objective:     /80 (BP Location: Left arm, Patient Position: Sitting, BP Cuff Size: Adult)   Pulse 78   Temp 36.7 °C (98 °F) (Temporal)   Resp 20   Ht 1.626 m (5' 4\")   Wt 91.1 kg (200 lb 12.8 oz)   SpO2 93%   BMI 34.47 kg/m²     Physical Exam  Vitals and nursing note reviewed.   Constitutional:       General: She is not in acute distress.     Appearance: Normal appearance. She is well-developed and well-groomed. She is not toxic-appearing.   HENT:      Head: Normocephalic.   Cardiovascular:      Rate and Rhythm: Normal rate and regular rhythm.      Pulses: Normal pulses.   Pulmonary:      Effort: Pulmonary effort is normal.      Breath sounds: Normal breath sounds.   Musculoskeletal:         General: Normal range of motion.   Skin:     General: Skin is warm and dry.      Capillary Refill: Capillary refill takes less than 2 seconds.      Findings: No rash.   Neurological:      Mental Status: She is alert and oriented to person, place, and time.      Deep Tendon Reflexes:      Reflex Scores:       Patellar reflexes are 2+ on the right side and 2+ on the left side.  Psychiatric:         Attention and Perception: Attention normal.         Mood and Affect: Mood and affect normal.         Speech: Speech normal.         Behavior: Behavior normal. Behavior is cooperative.       Results for orders placed or performed in visit on 07/05/23   POCT Urinalysis   Result Value Ref Range    POC Color OTHER Negative    POC Appearance CLOUDY Negative    POC Glucose NEGATIVE Negative mg/dL    POC Bilirubin NEGATIVE Negative mg/dL    POC Ketones NEGATIVE Negative mg/dL    POC Specific Gravity 1.030 <1.005 - >1.030    POC Blood NEGATIVE Negative    POC Urine PH 5.5 5.0 - 8.0    POC Protein NEGATIVE Negative mg/dL    POC Urobiligen 0.2 E.U./DL Negative (0.2) mg/dL    POC Nitrites NEGATIVE Negative    POC Leukocyte Esterase NEGATIVE Negative         Assessment /Associated Orders:      1. Acute low back pain, unspecified back " dextrose      propofol Stopped (01/25/20 2006)    dexmedetomidine (PRECEDEX) IV infusion 0.5 mcg/kg/hr (01/25/20 1958)     PRN Meds:oxyCODONE-acetaminophen **OR** oxyCODONE-acetaminophen, norepinephrine, EPINEPHrine infusion, nitroGLYCERIN, sodium chloride flush, ondansetron, glucose, dextrose, glucagon (rDNA), dextrose, albumin human, fentanNYL **OR** fentanNYL, LORazepam, acetaminophen, ipratropium-albuterol    Beta- Blocker:  Yes  Aspirin: Yes  Lovenox: No  GI: Yes  Plavix: Yes  Coumadin: No  Statin: Yes  ACE: No    Assessment/ Plan:  POD #2 CABG  Neuro NFD  HD stable low dose beta no ACE renal dysf  Puylmonary atelectasis ambulate IS  GI tolerating diet Bowel porotocol   consult nephrology BUN/Creatine up slightly hold lasix  D/C IJ whem good peropherals  [place] per urology  D/C planning      LAURA Bonds pain laterality, unspecified whether sciatica present  POCT Urinalysis    ketorolac (Toradol) injection 30 mg    cyclobenzaprine (FLEXERIL) 5 mg tablet            Medical Decision Making:    Pt is clinically stable at today's acute urgent care visit.  No acute distress noted. Appropriate for outpatient care at this time.   Acute problem today with uncertain prognosis. Pain appears to be muscular in nature secondary to sleeping in different position. Pain with movement. No s/sx trauma. No ecchymosis. No recent illness, cough or congestion.   Educated in proper administration of  prescription medication(s) ordered today including safety, possible SE, risks, benefits, rationale and alternatives to therapy.   Educated not to drive, drink alcohol, operate machinery, or do anything that requires mental alertness while taking flexeril RX that has sedation/ drowsiness as a side effect.  Allow an 8-hour wear of time before participating in any of these activities.  Toradol given in clinic today. Tolerated well without adverse effects. Advised not to take NSAIDS for 6-8 hours post injection.     Keep well hydrated  Gentle ROM exercises   OTC analgesic creams/ rubs.       Discussed Dx, management options (risks,benefits, and alternatives to planned treatment), natural progression and supportive care.  Expressed understanding and the treatment plan was agreed upon.   Questions were encouraged and answered   Return to urgent care prn if new or worsening sx or if there is no improvement in condition prn.    Educated in Red flags and indications to immediately call 911 or present to the Emergency Department.       Time I spent evaluating Jadyn Mcgrath in urgent care today was 33  minutes. This time includes preparing for visit, reviewing any pertinent notes or test results, counseling/education, exam, obtaining HPI, interpretation of lab tests, medication management and documentation as indicated above.Time does not  include separately billable procedures noted .       Please note that this dictation was created using voice recognition software. I have worked with consultants from the vendor as well as technical experts from [facility] to optimize the interface. I have made every reasonable attempt to correct obvious errors, but I expect that there are errors of grammar and possibly content that I did not discover before finalizing the note.  This note was electronically signed by provider

## 2023-07-05 NOTE — LETTER
July 5, 2023       Patient: Jadyn Mcgrath   YOB: 1995   Date of Visit: 7/5/2023         To Whom It May Concern:    In my medical opinion, I recommend that Jadyn Mcgrath return to full duty, no restrictions 07/07/23.            Sincerely,          ARLETH FarrellPJUAN  Electronically Signed

## 2023-10-11 ENCOUNTER — OFFICE VISIT (OUTPATIENT)
Dept: MEDICAL GROUP | Facility: MEDICAL CENTER | Age: 28
End: 2023-10-11
Payer: COMMERCIAL

## 2023-10-11 VITALS
SYSTOLIC BLOOD PRESSURE: 122 MMHG | HEART RATE: 73 BPM | WEIGHT: 192 LBS | BODY MASS INDEX: 32.78 KG/M2 | OXYGEN SATURATION: 95 % | DIASTOLIC BLOOD PRESSURE: 76 MMHG | TEMPERATURE: 97 F | HEIGHT: 64 IN

## 2023-10-11 DIAGNOSIS — R73.03 PREDIABETES: ICD-10-CM

## 2023-10-11 DIAGNOSIS — E78.00 PURE HYPERCHOLESTEROLEMIA: ICD-10-CM

## 2023-10-11 DIAGNOSIS — R79.89 ELEVATED TESTOSTERONE LEVEL IN FEMALE: ICD-10-CM

## 2023-10-11 DIAGNOSIS — E66.9 OBESITY (BMI 30-39.9): ICD-10-CM

## 2023-10-11 DIAGNOSIS — F43.23 ADJUSTMENT REACTION WITH ANXIETY AND DEPRESSION: ICD-10-CM

## 2023-10-11 DIAGNOSIS — Z00.00 ANNUAL PHYSICAL EXAM: ICD-10-CM

## 2023-10-11 DIAGNOSIS — E55.9 VITAMIN D DEFICIENCY: ICD-10-CM

## 2023-10-11 PROCEDURE — 3074F SYST BP LT 130 MM HG: CPT | Performed by: NURSE PRACTITIONER

## 2023-10-11 PROCEDURE — 99214 OFFICE O/P EST MOD 30 MIN: CPT | Performed by: NURSE PRACTITIONER

## 2023-10-11 PROCEDURE — 3078F DIAST BP <80 MM HG: CPT | Performed by: NURSE PRACTITIONER

## 2023-10-11 RX ORDER — SERTRALINE HYDROCHLORIDE 25 MG/1
25 TABLET, FILM COATED ORAL EVERY EVENING
Qty: 90 TABLET | Refills: 3 | Status: SHIPPED | OUTPATIENT
Start: 2023-10-11 | End: 2024-02-01

## 2023-10-11 RX ORDER — PROPRANOLOL HYDROCHLORIDE 20 MG/1
20-40 TABLET ORAL 2 TIMES DAILY PRN
Qty: 30 TABLET | Refills: 2 | Status: SHIPPED | OUTPATIENT
Start: 2023-10-11

## 2023-10-11 NOTE — PROGRESS NOTES
"Subjective:   Jadyn Mcgrath is a 28 y.o. female here today for anxiety, depression    Adjustment reaction with anxiety and depression  Ongoing for the past 3 weeks after the loss of her 7 year relationship. She has been having significant anxiety and depression that is interfering with her ability to work a full day, spend time with family/friends.          Current medicines (including changes today)  Current Outpatient Medications   Medication Sig Dispense Refill    sertraline (ZOLOFT) 25 MG tablet Take 1 Tablet by mouth every evening. 90 Tablet 3    propranolol (INDERAL) 20 MG Tab Take 1-2 Tablets by mouth 2 times a day as needed (anxiety). 30 Tablet 2    cyclobenzaprine (FLEXERIL) 5 mg tablet Take 1-2 Tablets by mouth 3 times a day as needed for Muscle Spasms or Mild Pain. 20 Tablet 0     No current facility-administered medications for this visit.     She  has a past medical history of Anxiety, Elevated hemoglobin A1c, Hyperlipidemia, Transaminitis, and Vitamin D deficiency.    ROS   No chest pain, no shortness of breath, no abdominal pain  Positive ROS as per HPI.  All other systems reviewed and are negative.     Objective:     /76   Pulse 73   Temp 36.1 °C (97 °F) (Temporal)   Ht 1.626 m (5' 4\")   Wt 87.1 kg (192 lb)   SpO2 95%  Body mass index is 32.96 kg/m².     Physical Exam:  Constitutional: Alert, no distress.  Skin: Warm, dry, good turgor, no rashes in visible areas.  Eye: Equal, round and reactive, conjunctiva clear, lids normal.  ENMT: Lips without lesions, good dentition  Respiratory: Unlabored respiratory effort  Psych: Alert and oriented x3, normal affect and mood.        Assessment and Plan:   The following treatment plan was discussed    1. Adjustment reaction with anxiety and depression  Unstable  Referral placed to therapy  Start sertraline 25 mg nightly  Continue propranolol as needed, refilled  - Referral to Behavioral Health  - sertraline (ZOLOFT) 25 MG tablet; Take " 1 Tablet by mouth every evening.  Dispense: 90 Tablet; Refill: 3  - propranolol (INDERAL) 20 MG Tab; Take 1-2 Tablets by mouth 2 times a day as needed (anxiety).  Dispense: 30 Tablet; Refill: 2    2. Annual physical exam  - CBC WITH DIFFERENTIAL; Future  - Comp Metabolic Panel; Future  - VITAMIN D,25 HYDROXY (DEFICIENCY); Future  - TSH WITH REFLEX TO FT4; Future  - Lipid Profile; Future  - HEMOGLOBIN A1C; Future  - TESTOSTERONE F&T FEMALES/CHILD; Future    3. Vitamin D deficiency  - VITAMIN D,25 HYDROXY (DEFICIENCY); Future    4. Pure hypercholesterolemia  - Lipid Profile; Future    5. Prediabetes  - HEMOGLOBIN A1C; Future    6. Elevated testosterone level in female  - TESTOSTERONE F&T FEMALES/CHILD; Future      Followup: Return in about 4 weeks (around 11/8/2023) for Annual, Depression/Anxiety, Results Review.    The MA is performing the above orders under the direction of Dr. Vargas    Please note that this dictation was created using voice recognition software. I have made every reasonable attempt to correct obvious errors, but I expect that there are errors of grammar and possibly content that I did not discover before finalizing the note.

## 2023-10-11 NOTE — ASSESSMENT & PLAN NOTE
Ongoing for the past 3 weeks after the loss of her 7 year relationship. She has been having significant anxiety and depression that is interfering with her ability to work a full day, spend time with family/friends.

## 2024-01-07 ENCOUNTER — OFFICE VISIT (OUTPATIENT)
Dept: URGENT CARE | Facility: CLINIC | Age: 29
End: 2024-01-07
Payer: COMMERCIAL

## 2024-01-07 VITALS
HEART RATE: 106 BPM | SYSTOLIC BLOOD PRESSURE: 130 MMHG | DIASTOLIC BLOOD PRESSURE: 86 MMHG | BODY MASS INDEX: 32.44 KG/M2 | RESPIRATION RATE: 14 BRPM | WEIGHT: 190 LBS | TEMPERATURE: 99.7 F | HEIGHT: 64 IN | OXYGEN SATURATION: 98 %

## 2024-01-07 DIAGNOSIS — R68.89 FLU-LIKE SYMPTOMS: ICD-10-CM

## 2024-01-07 DIAGNOSIS — J06.9 VIRAL URI WITH COUGH: ICD-10-CM

## 2024-01-07 LAB
FLUAV RNA SPEC QL NAA+PROBE: NEGATIVE
FLUBV RNA SPEC QL NAA+PROBE: NEGATIVE
RSV RNA SPEC QL NAA+PROBE: NEGATIVE
SARS-COV-2 RNA RESP QL NAA+PROBE: NEGATIVE

## 2024-01-07 PROCEDURE — 0241U POCT CEPHEID COV-2, FLU A/B, RSV - PCR: CPT | Performed by: NURSE PRACTITIONER

## 2024-01-07 PROCEDURE — 3075F SYST BP GE 130 - 139MM HG: CPT | Performed by: NURSE PRACTITIONER

## 2024-01-07 PROCEDURE — 3079F DIAST BP 80-89 MM HG: CPT | Performed by: NURSE PRACTITIONER

## 2024-01-07 PROCEDURE — 99213 OFFICE O/P EST LOW 20 MIN: CPT | Performed by: NURSE PRACTITIONER

## 2024-01-07 RX ORDER — DEXTROMETHORPHAN HYDROBROMIDE AND PROMETHAZINE HYDROCHLORIDE 15; 6.25 MG/5ML; MG/5ML
5 SYRUP ORAL
Qty: 120 ML | Refills: 0 | Status: SHIPPED | OUTPATIENT
Start: 2024-01-07 | End: 2024-02-01

## 2024-01-07 ASSESSMENT — ENCOUNTER SYMPTOMS
ORTHOPNEA: 0
CHILLS: 0
SORE THROAT: 0
SPUTUM PRODUCTION: 1
NAUSEA: 0
SHORTNESS OF BREATH: 0
COUGH: 1
FEVER: 0
EYE DISCHARGE: 0
HEADACHES: 1
DIARRHEA: 0
MYALGIAS: 1
WHEEZING: 0

## 2024-01-08 NOTE — PROGRESS NOTES
"Subjective     Jadyn Thea Mcgrath is a 28 y.o. female who presents with Cough (X2 weeks: Sore throat, cough hurts chest. )            HPI  New problem.  Patient is a 28-year-old female who presents with cough and nasal congestion x 2 weeks.  She reports that over the new year she was much better however after new year she started to get symptoms back and they are worse than they were before.  She denies fever, chills, myalgia however she does endorse sore throat, congestion, and cough.  She reports that her cough is worse at night making it difficult for her to sleep.  She has been taking over-the-counter DayQuil and NyQuil for her symptoms.    Review of Systems   Constitutional:  Positive for malaise/fatigue. Negative for chills and fever.   HENT:  Positive for congestion. Negative for sore throat.    Eyes:  Negative for discharge.   Respiratory:  Positive for cough and sputum production. Negative for shortness of breath and wheezing.    Cardiovascular:  Negative for chest pain and orthopnea.   Gastrointestinal:  Negative for diarrhea and nausea.   Musculoskeletal:  Positive for myalgias.   Neurological:  Positive for headaches.   Endo/Heme/Allergies:  Negative for environmental allergies.              Objective     /86   Pulse (!) 106   Temp 37.6 °C (99.7 °F)   Resp 14   Ht 1.626 m (5' 4\")   Wt 86.2 kg (190 lb)   SpO2 98%   BMI 32.61 kg/m²      Physical Exam  Vitals and nursing note reviewed.   Constitutional:       General: She is not in acute distress.     Appearance: She is well-developed.   HENT:      Head: Normocephalic and atraumatic.      Right Ear: Ear canal and external ear normal. No middle ear effusion. Tympanic membrane is not injected or perforated.      Left Ear: Ear canal and external ear normal.  No middle ear effusion. Tympanic membrane is not injected or perforated.      Nose: Mucosal edema, congestion and rhinorrhea present.      Mouth/Throat:      Pharynx: No " oropharyngeal exudate or posterior oropharyngeal erythema.   Eyes:      General:         Right eye: No discharge.         Left eye: No discharge.      Conjunctiva/sclera: Conjunctivae normal.   Cardiovascular:      Rate and Rhythm: Regular rhythm. Tachycardia present.      Heart sounds: Normal heart sounds. No murmur heard.  Pulmonary:      Effort: Pulmonary effort is normal. No respiratory distress.      Breath sounds: Normal breath sounds.   Musculoskeletal:         General: Normal range of motion.      Cervical back: Normal range of motion and neck supple.      Comments: Normal movement of all 4 extremities.   Lymphadenopathy:      Cervical: No cervical adenopathy.      Upper Body:      Right upper body: No supraclavicular adenopathy.      Left upper body: No supraclavicular adenopathy.   Skin:     General: Skin is warm and dry.   Neurological:      Mental Status: She is alert and oriented to person, place, and time.      Gait: Gait normal.   Psychiatric:         Behavior: Behavior normal.         Thought Content: Thought content normal.                             Assessment & Plan        1. Viral URI with cough        2. Flu-like symptoms  POCT CEPHEID COV-2, FLU A/B, RSV - PCR    promethazine-dextromethorphan (PROMETHAZINE-DM) 6.25-15 MG/5ML syrup        Negative viral testing.   Cough medication- nighttime use only; cautioned on sedation potential.   Differential diagnosis, natural history, supportive care, and indications for immediate follow-up were discussed. ]

## 2024-01-26 ENCOUNTER — HOSPITAL ENCOUNTER (OUTPATIENT)
Dept: LAB | Facility: MEDICAL CENTER | Age: 29
End: 2024-01-26
Attending: NURSE PRACTITIONER
Payer: COMMERCIAL

## 2024-01-26 DIAGNOSIS — R79.89 ELEVATED TESTOSTERONE LEVEL IN FEMALE: ICD-10-CM

## 2024-01-26 DIAGNOSIS — E55.9 VITAMIN D DEFICIENCY: ICD-10-CM

## 2024-01-26 DIAGNOSIS — R73.03 PREDIABETES: ICD-10-CM

## 2024-01-26 DIAGNOSIS — E78.00 PURE HYPERCHOLESTEROLEMIA: ICD-10-CM

## 2024-01-26 DIAGNOSIS — Z00.00 ANNUAL PHYSICAL EXAM: ICD-10-CM

## 2024-01-26 LAB
25(OH)D3 SERPL-MCNC: 20 NG/ML (ref 30–100)
BASOPHILS # BLD AUTO: 0.7 % (ref 0–1.8)
BASOPHILS # BLD: 0.05 K/UL (ref 0–0.12)
EOSINOPHIL # BLD AUTO: 0.13 K/UL (ref 0–0.51)
EOSINOPHIL NFR BLD: 1.8 % (ref 0–6.9)
ERYTHROCYTE [DISTWIDTH] IN BLOOD BY AUTOMATED COUNT: 41.5 FL (ref 35.9–50)
EST. AVERAGE GLUCOSE BLD GHB EST-MCNC: 128 MG/DL
HBA1C MFR BLD: 6.1 % (ref 4–5.6)
HCT VFR BLD AUTO: 40.3 % (ref 37–47)
HGB BLD-MCNC: 13.3 G/DL (ref 12–16)
IMM GRANULOCYTES # BLD AUTO: 0.02 K/UL (ref 0–0.11)
IMM GRANULOCYTES NFR BLD AUTO: 0.3 % (ref 0–0.9)
LYMPHOCYTES # BLD AUTO: 1.91 K/UL (ref 1–4.8)
LYMPHOCYTES NFR BLD: 26 % (ref 22–41)
MCH RBC QN AUTO: 27.5 PG (ref 27–33)
MCHC RBC AUTO-ENTMCNC: 33 G/DL (ref 32.2–35.5)
MCV RBC AUTO: 83.4 FL (ref 81.4–97.8)
MONOCYTES # BLD AUTO: 0.37 K/UL (ref 0–0.85)
MONOCYTES NFR BLD AUTO: 5 % (ref 0–13.4)
NEUTROPHILS # BLD AUTO: 4.88 K/UL (ref 1.82–7.42)
NEUTROPHILS NFR BLD: 66.2 % (ref 44–72)
NRBC # BLD AUTO: 0 K/UL
NRBC BLD-RTO: 0 /100 WBC (ref 0–0.2)
PLATELET # BLD AUTO: 380 K/UL (ref 164–446)
PMV BLD AUTO: 9.2 FL (ref 9–12.9)
RBC # BLD AUTO: 4.83 M/UL (ref 4.2–5.4)
TSH SERPL DL<=0.005 MIU/L-ACNC: 1.17 UIU/ML (ref 0.38–5.33)
WBC # BLD AUTO: 7.4 K/UL (ref 4.8–10.8)

## 2024-01-26 PROCEDURE — 84403 ASSAY OF TOTAL TESTOSTERONE: CPT

## 2024-01-26 PROCEDURE — 80061 LIPID PANEL: CPT

## 2024-01-26 PROCEDURE — 83036 HEMOGLOBIN GLYCOSYLATED A1C: CPT

## 2024-01-26 PROCEDURE — 36415 COLL VENOUS BLD VENIPUNCTURE: CPT

## 2024-01-26 PROCEDURE — 85025 COMPLETE CBC W/AUTO DIFF WBC: CPT

## 2024-01-26 PROCEDURE — 84443 ASSAY THYROID STIM HORMONE: CPT

## 2024-01-26 PROCEDURE — 84402 ASSAY OF FREE TESTOSTERONE: CPT

## 2024-01-26 PROCEDURE — 80053 COMPREHEN METABOLIC PANEL: CPT

## 2024-01-26 PROCEDURE — 84270 ASSAY OF SEX HORMONE GLOBUL: CPT

## 2024-01-26 PROCEDURE — 82306 VITAMIN D 25 HYDROXY: CPT

## 2024-01-27 LAB
ALBUMIN SERPL BCP-MCNC: 4.5 G/DL (ref 3.2–4.9)
ALBUMIN/GLOB SERPL: 1.2 G/DL
ALP SERPL-CCNC: 103 U/L (ref 30–99)
ALT SERPL-CCNC: 133 U/L (ref 2–50)
ANION GAP SERPL CALC-SCNC: 9 MMOL/L (ref 7–16)
AST SERPL-CCNC: 62 U/L (ref 12–45)
BILIRUB SERPL-MCNC: 0.6 MG/DL (ref 0.1–1.5)
BUN SERPL-MCNC: 12 MG/DL (ref 8–22)
CALCIUM ALBUM COR SERPL-MCNC: 8.9 MG/DL (ref 8.5–10.5)
CALCIUM SERPL-MCNC: 9.3 MG/DL (ref 8.5–10.5)
CHLORIDE SERPL-SCNC: 104 MMOL/L (ref 96–112)
CHOLEST SERPL-MCNC: 227 MG/DL (ref 100–199)
CO2 SERPL-SCNC: 26 MMOL/L (ref 20–33)
CREAT SERPL-MCNC: 0.56 MG/DL (ref 0.5–1.4)
FASTING STATUS PATIENT QL REPORTED: NORMAL
GFR SERPLBLD CREATININE-BSD FMLA CKD-EPI: 126 ML/MIN/1.73 M 2
GLOBULIN SER CALC-MCNC: 3.9 G/DL (ref 1.9–3.5)
GLUCOSE SERPL-MCNC: 110 MG/DL (ref 65–99)
HDLC SERPL-MCNC: 40 MG/DL
LDLC SERPL CALC-MCNC: 167 MG/DL
POTASSIUM SERPL-SCNC: 3.9 MMOL/L (ref 3.6–5.5)
PROT SERPL-MCNC: 8.4 G/DL (ref 6–8.2)
SODIUM SERPL-SCNC: 139 MMOL/L (ref 135–145)
TRIGL SERPL-MCNC: 99 MG/DL (ref 0–149)

## 2024-01-31 LAB
SHBG SERPL-SCNC: 15 NMOL/L (ref 25–122)
TESTOST FREE SERPL-MCNC: 6.8 PG/ML (ref 0.8–7.4)
TESTOST SERPL-MCNC: 29 NG/DL (ref 9–55)

## 2024-02-01 ENCOUNTER — OFFICE VISIT (OUTPATIENT)
Dept: MEDICAL GROUP | Facility: MEDICAL CENTER | Age: 29
End: 2024-02-01
Payer: COMMERCIAL

## 2024-02-01 VITALS
HEIGHT: 64 IN | BODY MASS INDEX: 33.12 KG/M2 | TEMPERATURE: 97 F | SYSTOLIC BLOOD PRESSURE: 122 MMHG | OXYGEN SATURATION: 96 % | WEIGHT: 194 LBS | HEART RATE: 73 BPM | DIASTOLIC BLOOD PRESSURE: 68 MMHG

## 2024-02-01 DIAGNOSIS — F41.9 ANXIETY: ICD-10-CM

## 2024-02-01 DIAGNOSIS — E66.9 OBESITY (BMI 30-39.9): ICD-10-CM

## 2024-02-01 DIAGNOSIS — Z11.4 ENCOUNTER FOR SCREENING FOR HIV: ICD-10-CM

## 2024-02-01 DIAGNOSIS — R73.03 PREDIABETES: ICD-10-CM

## 2024-02-01 DIAGNOSIS — Z11.59 NEED FOR HEPATITIS C SCREENING TEST: ICD-10-CM

## 2024-02-01 DIAGNOSIS — E78.00 PURE HYPERCHOLESTEROLEMIA: ICD-10-CM

## 2024-02-01 DIAGNOSIS — E55.9 VITAMIN D DEFICIENCY: ICD-10-CM

## 2024-02-01 DIAGNOSIS — R74.01 TRANSAMINITIS: ICD-10-CM

## 2024-02-01 DIAGNOSIS — R42 DIZZINESS: ICD-10-CM

## 2024-02-01 PROCEDURE — 99214 OFFICE O/P EST MOD 30 MIN: CPT | Performed by: NURSE PRACTITIONER

## 2024-02-01 PROCEDURE — 3074F SYST BP LT 130 MM HG: CPT | Performed by: NURSE PRACTITIONER

## 2024-02-01 PROCEDURE — 3078F DIAST BP <80 MM HG: CPT | Performed by: NURSE PRACTITIONER

## 2024-02-01 RX ORDER — ERGOCALCIFEROL 1.25 MG/1
50000 CAPSULE ORAL
Qty: 12 CAPSULE | Refills: 0 | Status: SHIPPED | OUTPATIENT
Start: 2024-02-01

## 2024-02-01 ASSESSMENT — FIBROSIS 4 INDEX: FIB4 SCORE: 0.41

## 2024-02-01 NOTE — ASSESSMENT & PLAN NOTE
Chronic, worse on recent labs with A1C 6.1. Diet has been worse with increased life stress.     Since getting labs she has stopped soda, started working out, has pelaton bike, doing this 30 mins 5 days per week.

## 2024-02-01 NOTE — ASSESSMENT & PLAN NOTE
Chronic, had worsened recently after loss of relationship. Prescribed sertraline, she took this once before bed however reports that she could hear voices in her head and that she was asleep but her brain was awake.     She is seeing therapy which has been helpful. Taking propranolol as needed but hasn't need to take.

## 2024-02-01 NOTE — LETTER
MARCUS Jimenez.  [place] [id number] [facility] [place] [address]  Phone: [phone] - Fax: [phone]        February 1, 2024       Patient: Jadyn Mcgrath   YOB: 1995   Date of Visit: 2/1/2024         To Whom It May Concern:    In my medical opinion, I recommend that Jadyn Mcgrath not attend [place] [employer] in April 2024 due to worsening medical conditions and new issue of fainting episodes. Please provide her with refund due to recent change in health status.     If you have any questions or concerns, please don't hesitate to call [phone]          Sincerely,          MALIK Jimenez  Electronically Signed

## 2024-02-01 NOTE — ASSESSMENT & PLAN NOTE
"Ongoing for 2 months, occurs around every 3 days. Occurs randomly. Has been under a lot of stress.     Will feel her heart racing after symptoms. Will happen if she is sitting or standing. Vision will go blurry, feels like she is \"not there\" for a few seconds, feels like she will pass out, then resolves and she feels fine.     Worse when she is out in the sun. Water intake is not the best. Water intake is two water bottles per day. No caffeine. No alcohol. Drinks sprite occasionally, was drinking one with every meal, hasn't had in 3 weeks.   "

## 2024-02-02 NOTE — PROGRESS NOTES
"Subjective:   Jadyn Mcgrath is a 28 y.o. female here today for anxiety follow up, lab review    Anxiety  Chronic, had worsened recently after loss of relationship. Prescribed sertraline, she took this once before bed however reports that she could hear voices in her head and that she was asleep but her brain was awake.     She is seeing therapy which has been helpful. Taking propranolol as needed but hasn't need to take.     Dizziness  Ongoing for 2 months, occurs around every 3 days. Occurs randomly. Has been under a lot of stress.     Will feel her heart racing after symptoms. Will happen if she is sitting or standing. Vision will go blurry, feels like she is \"not there\" for a few seconds, feels like she will pass out, then resolves and she feels fine.     Worse when she is out in the sun. Water intake is not the best. Water intake is two water bottles per day. No caffeine. No alcohol. Drinks sprite occasionally, was drinking one with every meal, hasn't had in 3 weeks.     Prediabetes  Chronic, worse on recent labs with A1C 6.1. Diet has been worse with increased life stress.     Since getting labs she has stopped soda, started working out, has pel[place] bike, doing this 30 mins 5 days per week.     Vitamin D deficiency  Unstable, chronic. Hasn't been taking daily vitamin D. Recent level 20, previously as low as 13.     Transaminitis  Chronic. Improved on recent labs. Has been working on diet changes and exercise    Liver US in 2018 was normal.      Current medicines (including changes today)  Current Outpatient Medications   Medication Sig Dispense Refill    ergocalciferol (DRISDOL) 48202 UNIT capsule Take 1 Capsule by mouth every 7 days. 12 Capsule 0    propranolol (INDERAL) 20 MG Tab Take 1-2 Tablets by mouth 2 times a day as needed (anxiety). 30 Tablet 2     No current facility-administered medications for this visit.     She  has a past medical history of Anxiety, Elevated hemoglobin A1c, " "Hyperlipidemia, Transaminitis, and Vitamin D deficiency.    ROS   No chest pain, no shortness of breath, no abdominal pain  Positive ROS as per HPI.  All other systems reviewed and are negative.     Objective:     /68   Pulse 73   Temp 36.1 °C (97 °F) (Temporal)   Ht 1.626 m (5' 4\")   Wt 88 kg (194 lb)   SpO2 96%  Body mass index is 33.3 kg/m².     Physical Exam:  Constitutional: Alert, no distress.  Skin: Warm, dry, good turgor, no rashes in visible areas.  Eye: Equal, round and reactive, conjunctiva clear, lids normal.  ENMT: Lips without lesions, good dentition  Respiratory: Unlabored respiratory effort  Psych: Alert and oriented x3, normal affect and mood.        Assessment and Plan:   The following treatment plan was discussed    1. Anxiety  Unstable, improving  Continue follow up with therapy  Propranolol as needed    2. Vitamin D deficiency  Unstable  Vit D2 weekly for 12 weeks, then D3 2000 IU daily  - ergocalciferol (DRISDOL) 00546 UNIT capsule; Take 1 Capsule by mouth every 7 days.  Dispense: 12 Capsule; Refill: 0    3. Dizziness  Unstable  Possibly hydration related in combination with anxiety  Advised doubling or tripling water intake  If not improved, consider holter monitor    4. Prediabetes  Unstable, worse  Diet, exercise, weight loss advised  Repeat A1c 3-6 months  - HEMOGLOBIN A1C; Future    5. Pure hypercholesterolemia  Unstable  Diet, exercise, weight loss advised  - Lipid Profile; Future    6. Transaminitis  Unstable, improving  If LFTs do not normalized will repeat US  Repeat labs 3-6 months  - Comp Metabolic Panel; Future    7. Encounter for screening for HIV  - HIV AG/AB COMBO ASSAY SCREENING; Future    8. Need for hepatitis C screening test  - HEP C VIRUS ANTIBODY; Future      Followup: Return in about 3 months (around 5/1/2024) for Results Review.    The MA is performing the above orders under the direction of Dr. Vargas    Please note that this dictation was created using " voice recognition software. I have made every reasonable attempt to correct obvious errors, but I expect that there are errors of grammar and possibly content that I did not discover before finalizing the note.

## 2024-02-02 NOTE — ASSESSMENT & PLAN NOTE
Bedside and Verbal shift change report received from off-going nurse JAZZ Guerin Report included the following information SBAR, Kardex and Cardiac Rhythm NSR/Ventricular [place].     Chronic. Improved on recent labs. Has been working on diet changes and exercise    Liver US in 2018 was normal.

## 2024-05-09 ENCOUNTER — APPOINTMENT (OUTPATIENT)
Dept: MEDICAL GROUP | Facility: MEDICAL CENTER | Age: 29
End: 2024-05-09
Payer: COMMERCIAL

## 2024-06-28 ENCOUNTER — APPOINTMENT (OUTPATIENT)
Dept: MEDICAL GROUP | Facility: MEDICAL CENTER | Age: 29
End: 2024-06-28
Payer: COMMERCIAL

## 2024-07-11 ENCOUNTER — HOSPITAL ENCOUNTER (OUTPATIENT)
Dept: LAB | Facility: MEDICAL CENTER | Age: 29
End: 2024-07-11
Attending: NURSE PRACTITIONER
Payer: COMMERCIAL

## 2024-07-11 DIAGNOSIS — R74.01 TRANSAMINITIS: ICD-10-CM

## 2024-07-11 DIAGNOSIS — E78.00 PURE HYPERCHOLESTEROLEMIA: ICD-10-CM

## 2024-07-11 DIAGNOSIS — Z11.59 NEED FOR HEPATITIS C SCREENING TEST: ICD-10-CM

## 2024-07-11 DIAGNOSIS — R73.03 PREDIABETES: ICD-10-CM

## 2024-07-11 DIAGNOSIS — Z11.4 ENCOUNTER FOR SCREENING FOR HIV: ICD-10-CM

## 2024-07-11 LAB
ALBUMIN SERPL BCP-MCNC: 4.4 G/DL (ref 3.2–4.9)
ALBUMIN/GLOB SERPL: 1.4 G/DL
ALP SERPL-CCNC: 91 U/L (ref 30–99)
ALT SERPL-CCNC: 104 U/L (ref 2–50)
ANION GAP SERPL CALC-SCNC: 12 MMOL/L (ref 7–16)
AST SERPL-CCNC: 42 U/L (ref 12–45)
BILIRUB SERPL-MCNC: 0.5 MG/DL (ref 0.1–1.5)
BUN SERPL-MCNC: 12 MG/DL (ref 8–22)
CALCIUM ALBUM COR SERPL-MCNC: 9.2 MG/DL (ref 8.5–10.5)
CALCIUM SERPL-MCNC: 9.5 MG/DL (ref 8.5–10.5)
CHLORIDE SERPL-SCNC: 105 MMOL/L (ref 96–112)
CHOLEST SERPL-MCNC: 228 MG/DL (ref 100–199)
CO2 SERPL-SCNC: 22 MMOL/L (ref 20–33)
CREAT SERPL-MCNC: 0.59 MG/DL (ref 0.5–1.4)
EST. AVERAGE GLUCOSE BLD GHB EST-MCNC: 117 MG/DL
GFR SERPLBLD CREATININE-BSD FMLA CKD-EPI: 124 ML/MIN/1.73 M 2
GLOBULIN SER CALC-MCNC: 3.2 G/DL (ref 1.9–3.5)
GLUCOSE SERPL-MCNC: 92 MG/DL (ref 65–99)
HBA1C MFR BLD: 5.7 % (ref 4–5.6)
HCV AB SER QL: NORMAL
HDLC SERPL-MCNC: 42 MG/DL
HIV 1+2 AB+HIV1 P24 AG SERPL QL IA: NORMAL
LDLC SERPL CALC-MCNC: 164 MG/DL
POTASSIUM SERPL-SCNC: 4.3 MMOL/L (ref 3.6–5.5)
PROT SERPL-MCNC: 7.6 G/DL (ref 6–8.2)
SODIUM SERPL-SCNC: 139 MMOL/L (ref 135–145)
TRIGL SERPL-MCNC: 111 MG/DL (ref 0–149)

## 2024-07-11 PROCEDURE — 86803 HEPATITIS C AB TEST: CPT

## 2024-07-11 PROCEDURE — 36415 COLL VENOUS BLD VENIPUNCTURE: CPT

## 2024-07-11 PROCEDURE — 87389 HIV-1 AG W/HIV-1&-2 AB AG IA: CPT

## 2024-07-11 PROCEDURE — 80061 LIPID PANEL: CPT

## 2024-07-11 PROCEDURE — 80053 COMPREHEN METABOLIC PANEL: CPT

## 2024-07-11 PROCEDURE — 83036 HEMOGLOBIN GLYCOSYLATED A1C: CPT

## 2024-08-26 ENCOUNTER — TELEPHONE (OUTPATIENT)
Dept: MEDICAL GROUP | Facility: MEDICAL CENTER | Age: 29
End: 2024-08-26
Payer: COMMERCIAL

## 2024-08-26 NOTE — TELEPHONE ENCOUNTER
Phone Number [phone]     Call outcome: Did not leave a detailed message. Requested patient to call back.    Message: Called LVM to inform patient to call the [phone] number to schedule a follow up appointment

## 2024-08-26 NOTE — TELEPHONE ENCOUNTER
----- Message from Nurse Practitioner MALIK Elise sent at 8/21/2024  4:11 PM PDT -----  Please schedule patient for follow up appointment to discuss lab results.   MALIK Jimenez